# Patient Record
Sex: FEMALE | Race: WHITE | NOT HISPANIC OR LATINO | Employment: FULL TIME | URBAN - METROPOLITAN AREA
[De-identification: names, ages, dates, MRNs, and addresses within clinical notes are randomized per-mention and may not be internally consistent; named-entity substitution may affect disease eponyms.]

---

## 2019-02-25 ENCOUNTER — INITIAL PRENATAL (OUTPATIENT)
Dept: OBGYN CLINIC | Facility: CLINIC | Age: 24
End: 2019-02-25
Payer: COMMERCIAL

## 2019-02-25 VITALS
DIASTOLIC BLOOD PRESSURE: 68 MMHG | HEIGHT: 68 IN | BODY MASS INDEX: 25.76 KG/M2 | SYSTOLIC BLOOD PRESSURE: 120 MMHG | WEIGHT: 170 LBS

## 2019-02-25 DIAGNOSIS — Z36.9 ANTENATAL SCREENING ENCOUNTER: ICD-10-CM

## 2019-02-25 DIAGNOSIS — Z34.01 ENCOUNTER FOR SUPERVISION OF NORMAL FIRST PREGNANCY IN FIRST TRIMESTER: Primary | ICD-10-CM

## 2019-02-25 PROCEDURE — 99204 OFFICE O/P NEW MOD 45 MIN: CPT | Performed by: OBSTETRICS & GYNECOLOGY

## 2019-02-25 NOTE — PROGRESS NOTES
INITIAL PRENATAL NURSE APPT:      Planned pregnancy - not actively attempting - d/c ocps 10/2017 - no birth control since  Pt wears seat belt  Pt has regular dental cleanings - due now  Pt works as  full time, plans to continue  No hx MRSA  Travels plans to [place] 2019 - moved here from [place] 10/2019 with 's job  Reviewed nutrition, SQS testing, CF testing, TDAP @ 28 wks  Initial prenatal labs ordered ([facility])  (+) nausea, no vomiting, (+) breast tenderness  No previous pap smear

## 2019-03-18 ENCOUNTER — ROUTINE PRENATAL (OUTPATIENT)
Dept: OBGYN CLINIC | Facility: CLINIC | Age: 24
End: 2019-03-18
Payer: COMMERCIAL

## 2019-03-18 VITALS — SYSTOLIC BLOOD PRESSURE: 124 MMHG | DIASTOLIC BLOOD PRESSURE: 82 MMHG | WEIGHT: 170.2 LBS | BODY MASS INDEX: 25.88 KG/M2

## 2019-03-18 DIAGNOSIS — Z36.9 ANTENATAL SCREENING ENCOUNTER: ICD-10-CM

## 2019-03-18 DIAGNOSIS — O03.9 MISCARRIAGE: Primary | ICD-10-CM

## 2019-03-18 LAB
ABO GROUP BLD: NORMAL
APPEARANCE UR: ABNORMAL
BACTERIA UR QL AUTO: ABNORMAL /HPF
BASOPHILS # BLD AUTO: 61 CELLS/UL (ref 0–200)
BASOPHILS NFR BLD AUTO: 0.8 %
BILIRUB UR QL STRIP: NEGATIVE
BLD GP AB SCN SERPL QL: NORMAL
COLOR UR: YELLOW
EOSINOPHIL # BLD AUTO: 114 CELLS/UL (ref 15–500)
EOSINOPHIL NFR BLD AUTO: 1.5 %
ERYTHROCYTE [DISTWIDTH] IN BLOOD BY AUTOMATED COUNT: 12.5 % (ref 11–15)
GLUCOSE UR QL STRIP: NEGATIVE
HBV SURFACE AG SERPL QL IA: NORMAL
HCT VFR BLD AUTO: 41 % (ref 35–45)
HGB BLD-MCNC: 13.5 G/DL (ref 11.7–15.5)
HGB UR QL STRIP: NEGATIVE
HIV 1+2 AB+HIV1 P24 AG SERPL QL IA: NORMAL
HYALINE CASTS #/AREA URNS LPF: ABNORMAL /LPF
KETONES UR QL STRIP: NEGATIVE
LEUKOCYTE ESTERASE UR QL STRIP: NEGATIVE
LYMPHOCYTES # BLD AUTO: 2075 CELLS/UL (ref 850–3900)
LYMPHOCYTES NFR BLD AUTO: 27.3 %
MCH RBC QN AUTO: 28.2 PG (ref 27–33)
MCHC RBC AUTO-ENTMCNC: 32.9 G/DL (ref 32–36)
MCV RBC AUTO: 85.8 FL (ref 80–100)
MONOCYTES # BLD AUTO: 524 CELLS/UL (ref 200–950)
MONOCYTES NFR BLD AUTO: 6.9 %
NEUTROPHILS # BLD AUTO: 4826 CELLS/UL (ref 1500–7800)
NEUTROPHILS NFR BLD AUTO: 63.5 %
NITRITE UR QL STRIP: NEGATIVE
PH UR STRIP: 7.5 [PH] (ref 5–8)
PLATELET # BLD AUTO: 281 THOUSAND/UL (ref 140–400)
PMV BLD REES-ECKER: 11.1 FL (ref 7.5–12.5)
PROT UR QL STRIP: NEGATIVE
RBC # BLD AUTO: 4.78 MILLION/UL (ref 3.8–5.1)
RBC #/AREA URNS HPF: ABNORMAL /HPF
RH BLD: NORMAL
RPR SER QL: NORMAL
RUBV IGG SERPL IA-ACNC: 3.31 INDEX
SP GR UR STRIP: 1.02 (ref 1–1.03)
SQUAMOUS #/AREA URNS HPF: ABNORMAL /HPF
WBC # BLD AUTO: 7.6 THOUSAND/UL (ref 3.8–10.8)
WBC #/AREA URNS HPF: ABNORMAL /HPF

## 2019-03-18 PROCEDURE — 99214 OFFICE O/P EST MOD 30 MIN: CPT | Performed by: OBSTETRICS & GYNECOLOGY

## 2019-03-18 NOTE — PATIENT INSTRUCTIONS
Transvaginal and transabdominal ultrasound showed a nonviable fetal pole  No cardiac activity  Will check quantitative beta HCGs S2   Make arrangements for ultrasound  Return my office this week for further evaluation  Patient is Rh positive

## 2019-03-18 NOTE — PROGRESS NOTES
Nonviable fetal pole approximately 6 weeks gestation  Will check quantitative beta HCG, arrangements for official hospital ultrasound, the patient is Rh positive  She return my office in 3 days for discussion of the studies  She will watch out for signs of bleeding or cramping

## 2019-03-19 LAB — B-HCG SERPL-ACNC: ABNORMAL MIU/ML

## 2019-03-20 ENCOUNTER — HOSPITAL ENCOUNTER (OUTPATIENT)
Dept: RADIOLOGY | Age: 24
Discharge: HOME | End: 2019-03-20
Payer: COMMERCIAL

## 2019-03-20 DIAGNOSIS — O03.9 MISCARRIAGE: ICD-10-CM

## 2019-03-20 PROCEDURE — 76815 OB US LIMITED FETUS(S): CPT

## 2019-03-21 ENCOUNTER — TELEPHONE (OUTPATIENT)
Dept: OBGYN CLINIC | Facility: CLINIC | Age: 24
End: 2019-03-21

## 2019-03-21 ENCOUNTER — OFFICE VISIT (OUTPATIENT)
Dept: OBGYN CLINIC | Facility: CLINIC | Age: 24
End: 2019-03-21
Payer: COMMERCIAL

## 2019-03-21 VITALS
WEIGHT: 169.4 LBS | BODY MASS INDEX: 27.23 KG/M2 | SYSTOLIC BLOOD PRESSURE: 110 MMHG | HEIGHT: 66 IN | DIASTOLIC BLOOD PRESSURE: 78 MMHG

## 2019-03-21 DIAGNOSIS — O02.1 MISSED ABORTION: Primary | ICD-10-CM

## 2019-03-21 LAB — B-HCG SERPL-ACNC: ABNORMAL MIU/ML

## 2019-03-21 PROCEDURE — 99214 OFFICE O/P EST MOD 30 MIN: CPT | Performed by: OBSTETRICS & GYNECOLOGY

## 2019-03-21 PROCEDURE — S0191 MISOPROSTOL, ORAL, 200 MCG: HCPCS | Performed by: OBSTETRICS & GYNECOLOGY

## 2019-03-21 NOTE — TELEPHONE ENCOUNTER
Patient noticed she passed one of the cytotec pills  Is unable to replace do to it being found in toilet bowl  Is having increased bleeding and spoke with Dr Galicia this afternoon for follow up  Told her I would run by with Edward Posey  and if needed to do anything different then follow up then discussed would call back

## 2019-03-21 NOTE — PATIENT INSTRUCTIONS
The patient has elected for using Cytotec  Eight hundred micro g replaced inside the vagina  She was counseled watch for increasing cramping and bleeding  To keep herself hydrated  Return my office tomorrow for follow-up

## 2019-03-21 NOTE — PROGRESS NOTES
HPI   this is a 26-year-old white female accompanied by her   Unfortunately had the been diagnosed with a missed   She is now bleeding or cramping of the present time  Ultrasound shows a nonviable fetus approximately 6 weeks size with drinking  We had a discussion of options Cytotec versus D& E  She and her  have now decided to proceed with placement of Cytotec in the vagina for a home remedy  We talked about the possibility of retained tissue we talked about heavy bleeding and cramping  She was offered the possibility of being on the and on scheduled tomorrow for D and E  All questions were answered  We did proceed with placing 800 micro g of Cytotec inside the vagina  She was counseled about increasing cramping  Expect bleeding and heavy  She will be seen in my office tomorrow for follow-up  She is Rh positive

## 2019-03-22 ENCOUNTER — OFFICE VISIT (OUTPATIENT)
Dept: OBGYN CLINIC | Facility: CLINIC | Age: 24
End: 2019-03-22
Payer: COMMERCIAL

## 2019-03-22 VITALS
SYSTOLIC BLOOD PRESSURE: 110 MMHG | BODY MASS INDEX: 27.16 KG/M2 | DIASTOLIC BLOOD PRESSURE: 80 MMHG | HEIGHT: 66 IN | WEIGHT: 169 LBS

## 2019-03-22 DIAGNOSIS — O03.9 MISCARRIAGE: Primary | ICD-10-CM

## 2019-03-22 PROCEDURE — 99213 OFFICE O/P EST LOW 20 MIN: CPT | Performed by: OBSTETRICS & GYNECOLOGY

## 2019-03-22 PROCEDURE — 88305 TISSUE EXAM BY PATHOLOGIST: CPT | Performed by: PATHOLOGY

## 2019-03-22 NOTE — PROGRESS NOTES
HPI   this is a 32-year-old white female accompanied by her   She was seen in this office on the 21st of March with a diagnosis of a missed AB  At that time the patient is Rh positive  She was given the options of surgical D&E verses vaginal Cytotec  She shows vaginal Cytotec  This is work well  Returns today for follow-up  She has some bleeding and cramping  Passes some clots  Transabdominal ultrasound shows the uterus is empty  Pelvic examination shows there is a large gestational sac inside the endocervical canal   This was removed with ring forceps and sent to pathology  Uterus is firm  No active bleeding was noted  Emotions she is doing well  Return my office in 2 weeks for re-evaluation  She was counseled to watch out for fever chills excessive bleeding  She will avoid sexual activity for least 1 week

## 2021-12-06 ENCOUNTER — TELEPHONE (OUTPATIENT)
Dept: PSYCHIATRY | Facility: CLINIC | Age: 26
End: 2021-12-06

## 2022-08-08 LAB
EXTERNAL HEMATOCRIT: 38.2 %
EXTERNAL HEMOGLOBIN: 13.1 G/DL
EXTERNAL HEPATITIS B SURFACE ANTIGEN: NEGATIVE
EXTERNAL PLATELET COUNT: 264 K/ÂΜL
EXTERNAL RUBELLA IGG QUANTITATION: 3.43
HIV1 P24 AG SERPL QL IA: NORMAL

## 2022-08-12 ENCOUNTER — OFFICE VISIT (OUTPATIENT)
Dept: OBGYN CLINIC | Facility: MEDICAL CENTER | Age: 27
End: 2022-08-12
Payer: COMMERCIAL

## 2022-08-12 VITALS
BODY MASS INDEX: 25.78 KG/M2 | DIASTOLIC BLOOD PRESSURE: 72 MMHG | SYSTOLIC BLOOD PRESSURE: 98 MMHG | WEIGHT: 160.4 LBS | HEIGHT: 66 IN

## 2022-08-12 DIAGNOSIS — Z34.91 PRENATAL CARE IN FIRST TRIMESTER: Primary | ICD-10-CM

## 2022-08-12 DIAGNOSIS — N91.1 SECONDARY AMENORRHEA: ICD-10-CM

## 2022-08-12 PROCEDURE — 76817 TRANSVAGINAL US OBSTETRIC: CPT | Performed by: STUDENT IN AN ORGANIZED HEALTH CARE EDUCATION/TRAINING PROGRAM

## 2022-08-12 PROCEDURE — 99213 OFFICE O/P EST LOW 20 MIN: CPT | Performed by: STUDENT IN AN ORGANIZED HEALTH CARE EDUCATION/TRAINING PROGRAM

## 2022-08-12 NOTE — PROGRESS NOTES
PNV 9+2    Patient denies any lof, vb or ctx  Patient urine is neg/neg  Patient has no concerns today

## 2022-08-12 NOTE — PROGRESS NOTES
Assessment/Plan:      27 y o  [id number] at 8 3/7 wga with LYNDA of 3/15/22 by LMP consistent w/ US today  Heart tones WNL  Pt to follow up for OB intake  She is not taking any teratogenic medications  Nausea/vomiting is minimal  Counseled to start prenatal vitamin  Subjective:      Patient ID:      27 y o  [id number] w/ LMP of 6/8 with ega of 9 2/7 wga and LYNDA of 3/15 presents for viability US  She has minimal nausea, no bleeding/cramping  US with C/ Greg Johnsoner 41 7/21 - 6 w 1 d, irregular GS per report      Review of Systems   Constitutional: Negative  Respiratory: Negative  Genitourinary: Negative  Psychiatric/Behavioral: Negative  Objective:      Physical Exam   Constitutional: She appears well-nourished  Cardiovascular: Normal rate  Pulmonary/Chest: Effort normal           Transvaginal US shows a live fetus with a heart beat of 175 bpm  Crown-rump length measures 2 89 cm, consistent with 9 5/7 wga  A yolk sac is visible within the gestational sac  We will keep the patient's due date of 3/15 based on her LMP

## 2022-08-19 ENCOUNTER — INITIAL PRENATAL (OUTPATIENT)
Dept: OBGYN CLINIC | Facility: CLINIC | Age: 27
End: 2022-08-19

## 2022-08-19 VITALS — BODY MASS INDEX: 25.71 KG/M2 | HEIGHT: 66 IN | WEIGHT: 160 LBS

## 2022-08-19 DIAGNOSIS — Z34.81 PRENATAL CARE, SUBSEQUENT PREGNANCY, FIRST TRIMESTER: ICD-10-CM

## 2022-08-19 PROCEDURE — OBC: Performed by: STUDENT IN AN ORGANIZED HEALTH CARE EDUCATION/TRAINING PROGRAM

## 2022-08-19 NOTE — PROGRESS NOTES
OB INTAKE INTERVIEW  Patient is 27 y o y o  who presents for OB intake at 10wks  She is accompanied by: herself  The father of her baby Renard Trevino) is involved in the pregnancy and is 32years old    Last Menstrual Period: 2022  Ultrasound: Measured 9 weeks 5 days on 2022 by CS  Estimated Date of Delivery: 3/15/2023 confirmed by 9 week US    Signs/Symptoms of Pregnancy  Current pregnancy symptoms: nausea  Constipation no  Headaches YES- if hasnt eaten in awhile   Cramping/spotting no  PICA cravings no    Diabetes-  Body mass index is 25 82 kg/m²  If patient has 1 or more, please order early 1 hour GTT  History of GDM no  BMI >35 no  History of PCOS or current metformin use no  History of LGA/macrosomic infant (4000g/9lbs) YES    If patient has 2 or more, please order early 1 hour GTT  BMI>30 no  AMA no  First degree relative with type 2 diabetes no  History of chronic HTN, hyperlipidemia, elevated A1C no  High risk race (, , ,  or ) no    Hypertension- if you answer yes, please order preeclampsia labs (cbc, comprehensive metabolic panel, urine protein creatinine ratio, uric acid)  History of of chronic HTN no  History of gestational HTN no  History of preeclampsia, eclampsia, or HELLP syndrome no  History of diabetes no  History of lupus, autoimmune disease, kidney disease no    Thyroid- if yes order TSH with reflex T4  History of thyroid disease no    Bleeding Disorder or Hx of DVT-patient or first degree relative with history of  Order the following if not done previously     (Factor V, antithrombin III, prothrombin gene mutation, protein C and S Ag, lupus anticoagulant, anticardiolipin, beta-2 glycoprotein)   no    OB/GYN-  History of abnormal pap smear no  History of HPV no  History of Herpes/HSV no  History of other STI (gonorrhea, chlamydia, trich) no  History of prior  YESx1  History of prior  no  History of  delivery prior to 36 weeks 6 days no  History of blood transfusion no  Ok for blood transfusion YES    Substance screening- if yes outside of tobacco for her or anyone in her home-order urine drug screen  History of tobacco use no  Currently using tobacco no  Currently using alcohol no  Presently using drugs no  Past drug use  no  IV drug use-If yes add Hep C antibody to labs no  Partner drug use no  Parent/Family drug use no    MRSA Screening-   Does the pt have a hx of MRSA? no  If yes- please follow MRSA protocol and obtain a nasal swab for MRSA culture    Immunizations:  Influenza vaccine given this season YES  Discussed Tdap vaccine YES  Discussed COVID Vaccine YES + booster    Genetic/MFM-  Do you or your partner have a history of any of the following in yourselves or first degree relatives? Cystic fibrosis no  Spinal muscular atrophy no  Hemoglobinopathy/Sickle Cell/Thalassemia no  Fragile X Intellectual Disability no    If yes, discuss carrier screening and recommend consultation with [facility]/genetic counseling  If no, discuss option for carrier screening and/or genetic testing with Nuchal Ultrasound  Patient interested YES  Appointment at [facility] made YES, NT is 9/13    Interview education  [facility] Pregnancy Essentials Book reviewed and discussed YES    Nurse/Family Partnership- patient may qualify NO; referral placed NO    Prenatal lab work scripts YES  Extra labs ordered:  RH- all other labs done on 8/8 from [facility]    The patient has a history now or in prior pregnancy notable for:  LGA      Details that I feel the provider should be aware of: Kp Menjivar and Elizabeth Lundberg are expecting their second baby  She delivered previously with [facility]- she did her PN labs with them and early US also  She also have a LGA baby, which she said runs in the family- her and sisters were mostly >9lbs  She said she was never dx with GDM  Explained I will add 1hr and SL RH to have on file  She was fine with doing the early screening   She is doing pretty well  Some nausea and HA is does for too long without eating  PN1 visit scheduled  The patient was oriented to our practice, reviewed delivering physicians and [facility] for Delivery  All questions were answered      Interviewed by: Alissa Lan MA

## 2022-08-19 NOTE — PATIENT INSTRUCTIONS
Congratulations!! Please review our Pregnancy Essential Guide and [employer] L[employer] Virtual tour from our [employer]  [facility] P[facility] Ess[facility] Guide  [facility] ([address] [facility])     [address] [facility] ([employer]  [facility])

## 2022-08-29 ENCOUNTER — INITIAL PRENATAL (OUTPATIENT)
Dept: OBGYN CLINIC | Facility: CLINIC | Age: 27
End: 2022-08-29

## 2022-08-29 VITALS — BODY MASS INDEX: 26.31 KG/M2 | SYSTOLIC BLOOD PRESSURE: 120 MMHG | WEIGHT: 163 LBS | DIASTOLIC BLOOD PRESSURE: 64 MMHG

## 2022-08-29 DIAGNOSIS — Z34.80 SUPERVISION OF OTHER NORMAL PREGNANCY, ANTEPARTUM: ICD-10-CM

## 2022-08-29 DIAGNOSIS — O26.20 HISTORY OF RECURRENT ABORTION, ANTEPARTUM: ICD-10-CM

## 2022-08-29 DIAGNOSIS — Z78.9 NOT IMMUNE TO HEPATITIS B VIRUS: ICD-10-CM

## 2022-08-29 DIAGNOSIS — Z11.3 SCREEN FOR STD (SEXUALLY TRANSMITTED DISEASE): ICD-10-CM

## 2022-08-29 DIAGNOSIS — Z34.81 PRENATAL CARE, SUBSEQUENT PREGNANCY, FIRST TRIMESTER: Primary | ICD-10-CM

## 2022-08-29 DIAGNOSIS — Z82.79 FAMILY HISTORY OF AUTOSOMAL TRANSLOCATION: ICD-10-CM

## 2022-08-29 PROCEDURE — 87491 CHLMYD TRACH DNA AMP PROBE: CPT | Performed by: CLINICAL NURSE SPECIALIST

## 2022-08-29 PROCEDURE — PNV: Performed by: CLINICAL NURSE SPECIALIST

## 2022-08-29 PROCEDURE — 87591 N.GONORRHOEAE DNA AMP PROB: CPT | Performed by: CLINICAL NURSE SPECIALIST

## 2022-08-29 RX ORDER — CELECOXIB 200 MG/1
CAPSULE ORAL
COMMUNITY
Start: 2022-06-28 | End: 2022-08-29

## 2022-08-29 RX ORDER — ASPIRIN 81 MG/1
81 TABLET, COATED ORAL 2 TIMES DAILY
COMMUNITY
Start: 2022-06-28 | End: 2022-08-29

## 2022-08-29 RX ORDER — OXYCODONE HYDROCHLORIDE AND ACETAMINOPHEN 5; 325 MG/1; MG/1
TABLET ORAL
COMMUNITY
Start: 2022-06-28 | End: 2022-08-29

## 2022-08-29 NOTE — PROGRESS NOTES
Prenatal Visit  Subjective: [employer] is a 24 y o  female  She is a [id number] here for initial PN visit/New OB exam    She is reporting the following pregnancy related complaints:  Occasional nausea when stomach empty  Denies vaginal bleeding  Denies cramping  LMP 22 with [facility] 3/15/23  US done 22- IUP c/w GA by LMP  No change in [facility]    Past history review including family genetic history reveal the following risk factors:  1  Prenatal care, subsequent pregnancy, first trimester    2  Screen for STD (sexually transmitted disease)    3  Supervision of other normal pregnancy, antepartum    4  History of recurrent , antepartum    5  Family history of autosomal translocation    6  Not immune to hepatitis B virus         Objective:  Patient's last menstrual period was 2022 (exact date)  /64   Wt 73 9 kg (163 lb)   LMP 2022 (Exact Date)   BMI 26 31 kg/m²   Pregravid Weight/BMI: 72 1 kg (159 lb) (BMI 25 68)      OBGyn Exam- see prenatal physical form    Assessment & Plan:      1  Prenatal care, subsequent pregnancy, first trimester  -     POCT urine dip  -     Chlamydia/GC amplified DNA by PCR    2  Screen for STD (sexually transmitted disease)  -     Chlamydia/GC amplified DNA by PCR    3  Supervision of other normal pregnancy, antepartum  Assessment & Plan:  She is a [id number] at 11w5d  Viability previously established  + FHR today by [place]  New OB Exam completed  Pap is not indicated and gonorrhea/chlamydia cultures collected  See other A&P for risk factors/identified    I reviewed the expected course of the pregnancy with visits, labs and additional testing  The patient has obtained her prenatal labs and they were reviewed at this visit  Genetic screening/testing options again reviewed and she declines genetic screening    I have reviewed the maternal as well as infant benefits of breastfeeding with the patient  The patient currently plans to breastfeed     I have reviewed proper nutrition in pregnancy as well as exercise and safe medications that can be used for various common symptoms in pregnancy  I reviewed the reasons to call in the first trimester - namely vaginal bleeding, severe nausea and vomiting, severe pelvic pain, fevers or pain/burning with urination  She was already previously referred to [facility] for [facility] US and will scheduled for level 2 G&A for 20-21 wks after that appt  I recommended that the patient receive the covid vaccine if not already done and to receive the flu vaccine during flu season  All questions were answered to her satisfaction  4  History of recurrent , antepartum  Assessment & Plan:  2 prior 1st trim sab- genetics consult ordered    Orders:  -     Ambulatory Referral to Maternal Fetal Medicine; Future; Expected date: 2022    5  Family history of autosomal translocation  Assessment & Plan:  Paternal cousin with balanced translocation  Genetic consult ordered    Orders:  -     Ambulatory Referral to Maternal Fetal Medicine; Future; Expected date: 2022    6   Not immune to hepatitis B virus  Assessment & Plan:  Pt counseled on non-immune status  Can be obtained through PCP          MEL Amato  2022

## 2022-08-29 NOTE — PROGRESS NOTES
Initial OB- previous pregnancy at [facility]  12w1d  Blue folder given  Pap- 3/18/21-negative  GC done today  AB positive  No cramping or bleeding  Round ligament discomfort

## 2022-08-29 NOTE — ASSESSMENT & PLAN NOTE
She is a [id number] at [facility] previously established  + FHR today by [employer]  New OB Exam completed  Pap is not indicated and gonorrhea/chlamydia cultures collected  See other A&P for risk factors/identified    I reviewed the expected course of the pregnancy with visits, labs and additional testing  The patient has obtained her prenatal labs and they were reviewed at this visit  Genetic screening/testing options again reviewed and she declines genetic screening    I have reviewed the maternal as well as infant benefits of breastfeeding with the patient  The patient currently plans to breastfeed  I have reviewed proper nutrition in pregnancy as well as exercise and safe medications that can be used for various common symptoms in pregnancy  I reviewed the reasons to call in the first trimester - namely vaginal bleeding, severe nausea and vomiting, severe pelvic pain, fevers or pain/burning with urination  She was already previously referred to [facility] for NT US and will scheduled for level 2 G&A for 20-21 wks after that appt  I recommended that the patient receive the covid vaccine if not already done and to receive the flu vaccine during flu season  All questions were answered to her satisfaction

## 2022-08-30 ENCOUNTER — APPOINTMENT (OUTPATIENT)
Dept: LAB | Facility: CLINIC | Age: 27
End: 2022-08-30
Payer: COMMERCIAL

## 2022-08-30 DIAGNOSIS — Z34.81 PRENATAL CARE, SUBSEQUENT PREGNANCY, FIRST TRIMESTER: ICD-10-CM

## 2022-08-30 LAB
ABO GROUP BLD: NORMAL
BLD GP AB SCN SERPL QL: NEGATIVE
C TRACH DNA SPEC QL NAA+PROBE: NEGATIVE
GLUCOSE 1H P 50 G GLC PO SERPL-MCNC: 84 MG/DL (ref 40–134)
N GONORRHOEA DNA SPEC QL NAA+PROBE: NEGATIVE
RH BLD: POSITIVE

## 2022-08-30 PROCEDURE — 86850 RBC ANTIBODY SCREEN: CPT

## 2022-08-30 PROCEDURE — 82950 GLUCOSE TEST: CPT

## 2022-08-30 PROCEDURE — 86900 BLOOD TYPING SEROLOGIC ABO: CPT

## 2022-08-30 PROCEDURE — 86901 BLOOD TYPING SEROLOGIC RH(D): CPT

## 2022-08-30 PROCEDURE — 36415 COLL VENOUS BLD VENIPUNCTURE: CPT

## 2022-09-13 ENCOUNTER — ROUTINE PRENATAL (OUTPATIENT)
Dept: PERINATAL CARE | Facility: OTHER | Age: 27
End: 2022-09-13
Payer: COMMERCIAL

## 2022-09-13 VITALS
SYSTOLIC BLOOD PRESSURE: 124 MMHG | DIASTOLIC BLOOD PRESSURE: 76 MMHG | WEIGHT: 165.34 LBS | BODY MASS INDEX: 26.57 KG/M2 | HEIGHT: 66 IN | HEART RATE: 81 BPM

## 2022-09-13 DIAGNOSIS — O09.299 HISTORY OF MACROSOMIA IN INFANT IN PRIOR PREGNANCY, CURRENTLY PREGNANT: ICD-10-CM

## 2022-09-13 DIAGNOSIS — Z3A.13 13 WEEKS GESTATION OF PREGNANCY: ICD-10-CM

## 2022-09-13 DIAGNOSIS — Z36.82 ENCOUNTER FOR ANTENATAL SCREENING FOR NUCHAL TRANSLUCENCY: Primary | ICD-10-CM

## 2022-09-13 PROCEDURE — 76813 OB US NUCHAL MEAS 1 GEST: CPT | Performed by: OBSTETRICS & GYNECOLOGY

## 2022-09-13 PROCEDURE — 99241 PR OFFICE CONSULTATION NEW/ESTAB PATIENT 15 MIN: CPT | Performed by: OBSTETRICS & GYNECOLOGY

## 2022-09-13 NOTE — PATIENT INSTRUCTIONS
Thank you for choosing us for your  care today  If you have any questions about your ultrasound or care, please do not hesitate to contact us or your primary obstetrician  Some general instructions for your pregnancy are:    Protect against coronavirus: get vaccinated - pregnant women are increased risk of severe COVID  Notify your primary care doctor if you have any symptoms  Exercise: Aim for 22 minutes per day (150 minutes per week) of regular exercise  Walking is great! Nutrition: aim for calcium-rich and iron-rich foods as well as healthy sources of protein  Learn about Preeclampsia: preeclampsia is a common, serious high blood pressure complication in pregnancy  A blood pressure of 103JYIS (systolic or top number) or 68EEIT (diastolic or bottom number) is not normal and needs evaluation by your doctor  Aspirin is sometimes prescribed in early pregnancy to prevent preeclampsia in women with risk factors - ask your obstetrician if you should be on this medication  If you smoke, try to reduce how many cigarettes you smoke or try to quit completely  Do not vape  Other warning signs to watch out for in pregnancy or postpartum: chest pain, obstructed breathing or shortness of breath, seizures, thoughts of hurting yourself or your baby, bleeding, a painful or swollen leg, fever, or headache (see [employer] POST-BIRTH Warning Signs campaign)  If these happen call 911  Itching is also not normal in pregnancy and if you experience this, especially over your hands and feet, potentially worse at night, notify your doctors

## 2022-09-13 NOTE — LETTER
2022    Kimberly Javier,  [facility] [address] [address]    Patient: Rafi Garcia   YOB: 1995   Date of Visit: 2022   Gestational age 14w11d   Amber Hannah of this communication: Routine       Dear Simin Guillen,    This patient was seen recently in our  office  The content of my evaluation today is in the ultrasound report under "OB Procedures" tab  Please don't hesitate to contact our office with any concerns or questions       Sincerely,      Jesse Lopez MD  Attending Physician, [place]

## 2022-09-13 NOTE — PROGRESS NOTES
Patient chose to have [facility] Non-invasive Prenatal Screen  Patient given brochure and is aware [facility] will contact patients insurance and coordinate coverage  Patient made aware she will need to respond to text message or e-mail from [employer] within 2 business days or testing will be run through insurance  Patient informed text message will come from area code  "415"  Provided [employer] # [phone] and web site : [email or web address]     2 vials of blood drawn from left arm, by Marcel Vazquez MA patient tolerated blood draw without difficulty  Specimens labeled with patient identifiers (name, date of birth, specimen collection date), order and specimen was verified with patient, packed and sent via [employer]  Copy of lab order scanned to Epic media  Maternal Fetal Medicine will have results in approximately 7-10 business days and will call patient or notify via [address]  Patient aware viewing lab result online will reveal fetal sex If ordered  Patient verbalized understanding of all instructions and no questions at this time

## 2022-09-13 NOTE — PROGRESS NOTES
[address]: Ms Jey Cotton was seen today for nuchal translucency ultrasound  See ultrasound report under "OB Procedures" tab  Review of Systems   Constitutional: Negative for chills, fever and unexpected weight change  HENT: Negative for congestion, dental problem, facial swelling and sore throat  Eyes: Negative for visual disturbance  Respiratory: Negative for cough and shortness of breath  Cardiovascular: Negative for chest pain and palpitations  Gastrointestinal: Negative for diarrhea and vomiting  Endocrine: Negative for polydipsia  Genitourinary: Negative for dysuria and vaginal bleeding  Musculoskeletal: Negative for back pain and joint swelling  Skin: Negative for rash and wound  Allergic/Immunologic: Negative for immunocompromised state  Neurological: Negative for seizures and headaches  Hematological: Does not bruise/bleed easily  Psychiatric/Behavioral: Negative for hallucinations and suicidal ideas  Physical Exam  Constitutional:       General: She is not in acute distress  Appearance: Normal appearance  She is not ill-appearing, toxic-appearing or diaphoretic  HENT:      Head: Normocephalic and atraumatic  Nose: No congestion or rhinorrhea  Eyes:      General: No scleral icterus  Right eye: No discharge  Left eye: No discharge  Extraocular Movements: Extraocular movements intact  Conjunctiva/sclera: Conjunctivae normal    Pulmonary:      Effort: Pulmonary effort is normal  No respiratory distress  Musculoskeletal:      Cervical back: Normal range of motion  Skin:     Coloration: Skin is not jaundiced or pale  Findings: No erythema, lesion or rash  Neurological:      General: No focal deficit present  Mental Status: She is alert and oriented to person, place, and time     Psychiatric:         Mood and Affect: Mood normal          Behavior: Behavior normal          Please don't hesitate to contact our office with any concerns or questions    Drake Bose MD

## 2022-09-19 ENCOUNTER — TELEMEDICINE (OUTPATIENT)
Dept: PERINATAL CARE | Facility: CLINIC | Age: 27
End: 2022-09-19

## 2022-09-19 DIAGNOSIS — Z84.89 FAMILY HISTORY OF GENETIC DISORDER: Primary | ICD-10-CM

## 2022-09-19 DIAGNOSIS — O35.2XX0 HEREDITARY DISEASE IN FAMILY POSSIBLY AFFECTING FETUS, AFFECTING MANAGEMENT OF MOTHER IN PREGNANCY, SINGLE OR UNSPECIFIED FETUS: ICD-10-CM

## 2022-09-19 DIAGNOSIS — Z31.5 ENCOUNTER FOR PROCREATIVE GENETIC COUNSELING: ICD-10-CM

## 2022-09-19 PROCEDURE — NC001 PR NO CHARGE: Performed by: GENETIC COUNSELOR, MS

## 2022-09-26 NOTE — PROGRESS NOTES
Genetic Counseling   High-Risk Gestation Note    Appointment Date:  2022  Referred By: Cameron Lopez  YOB: 1995  Partner:  James Milton  Indication for Visit:  personal and/or family history of chromosomal abnormality:  family history of balanced translocation  Pregnancy History:   Estimated Date of Delivery: 3/15/23  Estimated Gestational Age: 12w7d      Virtual Regular Visit    Verification of patient location:    Patient is located in the following state in which I hold an active license [place]      Assessment/Plan:    Problem List Items Addressed This Visit    None     Visit Diagnoses     Family history of genetic disorder    -  Primary    Hereditary disease in family possibly affecting fetus, affecting management of mother in pregnancy, single or unspecified fetus        Encounter for procreative genetic counseling                   Reason for visit is   Chief Complaint   Patient presents with    Virtual Regular Visit        Encounter provider Natalie Mc    Provider located at [address] [place] [address] [id number] [phone]      Recent Visits  No visits were found meeting these conditions  Showing recent visits within past 7 days and meeting all other requirements  Future Appointments  No visits were found meeting these conditions  Showing future appointments within next 150 days and meeting all other requirements       The patient was identified by name and date of birth  Marik Somers was informed that this is a telemedicine visit and that the visit is being conducted through [address] and patient was informed this is a secure, HIPAA-complaint platform  She agrees to proceed     My office door was closed  No one else was in the room  She acknowledged consent and understanding of privacy and security of the video platform   The patient has agreed to participate and understands they can discontinue the visit at any time  Iftikhar Ibarra is a 24 y o  female who presented for genetic counseling to discuss a family history of a balanced translocation  She had previously met with a genetic counselor at [facility] on 7/23/20 to discuss this history  Please see that note in [address] [place] [address] Loop for further details  Tiffany's paternal cousin was found to have a balanced translocation after experiencing seven first trimester pregnancy loss  The patient did not have information on the specific chromosomes involved in the translocation  Of note, Tiffany's paternal grandmother also had about 10 pregnancy losses, though the causes are not known  We discussed that carriers of reciprocal balanced translocations are typically unaffected individuals because they have all of their genetic information present, just in a different arrangement than what is typically seen  If this translocation is passed on there is a possibility for a pregnancy to inherit the balanced translocation and therefore be predicted to be unaffected  There is also the possibility for a pregnancy to inherit an unbalanced arrangement and be at risk for pregnancy loss, congenital anomalies and/or intellectual disability/developmental delays  Lastly, there is the possibility for a pregnancy to not inherit the translocation and not be affected  The risk for any unbalanced translocation in a pregnancy given a balanced chromosome translocation carrier ranges from 1-30%, with approximately 5-10% as the most common published risk  We discussed the option of a peripheral chromosome analysis for Hafsa Carias which she elected to pursue pending insurance approval   She will be notified for her blood draw once the prior authorization has been obtained  Histories for the patient and her partner's family were taken during our session  Hafsa Carias reports a paternal cousin once removed with Neurofibromatosis (NF)    She is not aware of the specific type but states that there are "vision issues"  There are no other paternal family members with any symptoms or features of NF thus her relative may have a de dianne mutation  We discussed that risk to the pregnancy would be that of the general population risk for NF  Further review of family history for the patient and her partner was noncontributory  The family history was not significant for other genetic diseases or disorders, intellectual disability, birth defects, fetal loss, or consanguinity  Patient reports being of /[place]al[place] descent and that her  is of [place]/ descent  She denies either of them having known Ashkenazi Sabianism ancestry  The benefits and limitations of Cystic fibrosis (CF), Spinal muscular atrophy (SMA), hemoglobinopathy, and expanded carrier screening was discussed  The patient was unsure of pursuing carrier screening at this time and elected to further consider the options  Should she decide to have any of the blood work performed she will contact our office or her OB office  The patient had NIPT bloodwork drawn on 9/13/22 and the results were pending at the time of our counseling session  We reviewed MSAFP screening at 15-20 weeks gestation which she elected to pursue  We reviewed that level II anatomy ultrasound is typically performed at approximately 20 weeks gestation  Level II ultrasound evaluation is between 60-80% accurate in detecting major physical birth defects and variations in fetal development that may be associated with chromosome/genetic abnormalities  Level II ultrasound evaluation is not able to detect all birth defects or health problems  She had her ultrasound scheduled for 10/25/22      Lastly, we discussed the fact that everyone in the general population regardless of age, family history, or medical background has approximately a 3-5% risk of having a child with some type of congenital anomaly, genetic disease or intellectual disability  Currently there are no tests available to rule out all birth defects or health problems  Casey Montero was provided with our contact information  I encouraged her to call with any questions or concerns  Plan/Tests Ordered:  1) Patient elected a peripheral chromosome analysis pending insurance approval   2) MSAFP screening at 15-20 weeks gestation - to be ordered by patient OB office  3) Level II anatomy ultrasound at approximately 20 weeks gestation  HPI     History reviewed  No pertinent past medical history  Past Surgical History:   Procedure Laterality Date    HAND SURGERY      age 17 - trauma    TONSILLECTOMY AND ADENOIDECTOMY      WISDOM TOOTH EXTRACTION         Current Outpatient Medications   Medication Sig Dispense Refill    Prenatal Multivit-Min-Fe-FA (PRENATAL VITAMINS PO) Take 1 tablet by mouth daily       No current facility-administered medications for this visit  No Known Allergies    Review of Systems    Video Exam    There were no vitals filed for this visit      Physical Exam     I spent 45 minutes directly with the patient during this visit

## 2022-09-28 ENCOUNTER — ROUTINE PRENATAL (OUTPATIENT)
Dept: OBGYN CLINIC | Facility: CLINIC | Age: 27
End: 2022-09-28

## 2022-09-28 VITALS — BODY MASS INDEX: 27.7 KG/M2 | DIASTOLIC BLOOD PRESSURE: 64 MMHG | WEIGHT: 171.6 LBS | SYSTOLIC BLOOD PRESSURE: 116 MMHG

## 2022-09-28 DIAGNOSIS — Z3A.16 16 WEEKS GESTATION OF PREGNANCY: ICD-10-CM

## 2022-09-28 DIAGNOSIS — O26.899 PELVIC PRESSURE IN PREGNANCY: ICD-10-CM

## 2022-09-28 DIAGNOSIS — Z36.9 UNSPECIFIED ANTENATAL SCREENING: ICD-10-CM

## 2022-09-28 DIAGNOSIS — Z33.1 PREGNANT STATE, INCIDENTAL: Primary | ICD-10-CM

## 2022-09-28 DIAGNOSIS — R10.2 PELVIC PRESSURE IN PREGNANCY: ICD-10-CM

## 2022-09-28 DIAGNOSIS — Z34.81 PRENATAL CARE, SUBSEQUENT PREGNANCY, FIRST TRIMESTER: ICD-10-CM

## 2022-09-28 LAB
SL AMB  POCT GLUCOSE, UA: NORMAL
SL AMB POCT URINE PROTEIN: NORMAL

## 2022-09-28 PROCEDURE — PNV: Performed by: OBSTETRICS & GYNECOLOGY

## 2022-09-28 NOTE — ASSESSMENT & PLAN NOTE
PN panel complete  NIPT low risk - did not find out gender, plans to find out at level II US  MSAFP ordered today  No bleeding/LOF/cramping  Level II US scheduled

## 2022-09-28 NOTE — PROGRESS NOTES
Feels well, c/o pelvic pressure   Denies any bleeding or cramping   AFP given today  Level II scheduled 10/25/2022

## 2022-09-28 NOTE — PROGRESS NOTES
Problem List Items Addressed This Visit        Other    Pelvic pressure in pregnancy     Had significant issues with this in first pregnancy  Discussed support belt as well as pelvic floor PT - she is very interested in PT  Referral given  Relevant Orders    Ambulatory Referral to Physical Therapy    16 weeks gestation of pregnancy     PN panel complete  NIPT low risk - did not find out gender, plans to find out at level II US  MSAFP ordered today  No bleeding/LOF/cramping  Level II US scheduled              Other Visit Diagnoses     Pregnant state, incidental    -  Primary    Relevant Orders    Alpha fetoprotein, maternal    Prenatal care, subsequent pregnancy, first trimester        Relevant Orders    POCT urine dip (Completed)    Ambulatory Referral to Physical Therapy    Unspecified  screening        Relevant Orders    Alpha fetoprotein, maternal

## 2022-09-28 NOTE — ASSESSMENT & PLAN NOTE
Had significant issues with this in first pregnancy  Discussed support belt as well as pelvic floor PT - she is very interested in PT  Referral given

## 2022-09-29 ENCOUNTER — DOCUMENTATION (OUTPATIENT)
Dept: PERINATAL CARE | Facility: CLINIC | Age: 27
End: 2022-09-29

## 2022-09-29 NOTE — PROGRESS NOTES
Call insurance to request auth for [phone] and [phone] per the automated system auth is not require, Confirmation # [id number]

## 2022-10-09 NOTE — PROGRESS NOTES
PT Evaluation     Today's date: 10/10/2022  Patient name: Wily Ordonez  : 1995  MRN: [id number]  Referring provider: Barbee Frankel, MD  Dx:   Encounter Diagnosis     ICD-10-CM    1  Abdominal weakness  R19 8    2  Prenatal care, subsequent pregnancy, first trimester  Z34 81 Ambulatory Referral to Physical Therapy   3  Pelvic pressure in pregnancy  O26 899 Ambulatory Referral to Physical Therapy    R10 2        Start Time: 1682  Stop Time: 1600  Total time in clinic (min): 50 minutes    Assessment  Assessment details: [employer] is a 24y o  year old female with complaints of pelvic girdle pain and pelvic pressure during pregnancy  The following impairments were found on evaluation: poor bladder habits, core weakness, poor pressure management  These impairments contribute to the following functional limitations: decreased tolerance to activity and functional mobility; and the following disability: decreased quality of life  Focus of POC is on core and pelvic girdle strengthening to maintain function t/o pregnancy  [employer]  is a good candidate for therapy and would benefit from skilled physical therapy to address the above impairments in order to allow the patient to achieve below goals and return to her prior level of function  Patient education provided on PFM anatomy and function, abdominal brace  and urge deferral strategy  Patient educated on diagnosis, plan of care and prognosis  Isabel Au are in agreement with recommended plan of care, goals for therapy and demonstrates motivation for active participation in proposed plan of care      Thank you Dr Fior Murphy for this referral!    Impairments: abnormal coordination, abnormal muscle tone, abnormal or restricted ROM, activity intolerance, impaired balance, impaired physical strength, lacks appropriate home exercise program, pain with function, poor posture  and poor body mechanics  Barriers to therapy: None   Understanding of Dx/Px/POC: good Prognosis: good    Goals  STG to be completed in 8 weeks from 10/10/2022:  1  Laurie Lewis will be independent with initial home exercise program    2  Laurie Lewis will demonstrate ability to contract, relax and actively lengthen PFM  3  Laurie Lewis will be independent with abdominal brace with position changes  4  Laurie Lewis will report pain no greater than 5/10 with all functional activity to allow Niki to return to maintain level of function  5  Laurie Lewis will be independent with urge deferral strategies  LTG to be completed in 12 weeks from 10/10/2022 or upon discharge from PFPT:  1  Laurie Lewis will be independent with advanced home exercise program for self-management of symptoms  2  Laurie Lewis will demonstrate ability to appropriately activate deep core muscles with functional mobility tasks  3  Laurie Lewis will be able to wear no pad or liner to decrease risk of urinary tract infection  4  Laurie Lewis will report pain no greater than 3/10 with all functional activity to allow Niki to return to maintain level of function  3  Laurie Lewis will report being able to participate in vaginal intercourse with no pain to allow patient improved quality of life  5  Laurie Lewis will be able to participate in pain-free vaginal examination to allow for health maintenance and monitoring  10  Laurie Lewis will participate in early labor positioning education          Plan  Patient would benefit from: skilled physical therapy  Planned modality interventions: biofeedback, cryotherapy, thermotherapy: hydrocollator packs and ultrasound  Planned therapy interventions: abdominal trunk stabilization, activity modification, ADL retraining, balance, balance/weight bearing training, behavior modification, body mechanics training, breathing training, coordination, fine motor coordination training, flexibility, functional ROM exercises, gait training, graded activity, graded exercise, graded motor, home exercise program, joint mobilization, manual therapy, massage, motor coordination training, neuromuscular re-education, patient education, postural training, strengthening, stretching, therapeutic activities and therapeutic exercise  Frequency: 1x week  Duration in weeks: 14  Plan of Care beginning date: 10/10/2022  Plan of Care expiration date: 2023  Treatment plan discussed with: patient, PTA and referring physician        PT Pelvic Floor Subjective:   History of Present Illness: Nadege Neri is a 24 y o  female who prefers she/her pronouns  They present to pelvic floor physical therapy with the primary complaint of pelvic girdle pain and pelvic pressure during pregnancy  They are referred to OPPT by Dr Dunia Peña Pu reports she is feeling good so far  During first pregnancy had a lot of hip and pelvic pain starting in second trimester that progressively got worse  Had pubic symphysis pain with first   Her mom has prolapse and is concerned about this for herself  Right now stating to feel a little pressure and would like to get started to prevent the discomfort she had last time        Due Date: March 15 2023    Social Support:     Lives in:  Multiple-level home (no issues on the stairs)    Lives with:  Spouse and young children    Relationship status: /committed    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 )    Life stress severity: mild  Diet and Exercise:    Diet:balanced nutrition    Exercise type: no activity and walking    Likes to go walking and be outside     Not exercising due to: lack of time  Co-morbidities:    Patient Active Problem List:     Not immune to hepatitis B virus     History of recurrent , antepartum     History of macrosomia in infant in prior pregnancy, currently pregnant     Pelvic pressure in pregnancy     16 weeks gestation of pregnancy  OB/ gyn History    Gestational History:     Prior Pregnancy: Yes      Number of prior pregnancies: 4    Number of term pregnancies: 1 (2 miscarriages )    Delivery Type: vaginal delivery      Number of vaginal deliveries: 1    Delivery Complications:  Did have tearing - anterior to clitoris   Bladder Function:     Voiding Difficulties positive for: hesitancy (has to intentionally relax ), straining and incomplete emptying      Voiding Difficulties negative for: urgency and frequent urination      Voiding Difficulties comments:     Voiding frequency: every 1-2 hours    Urinary leakage: urine leakage    Urinary leakage aggravated by: coughing, sneezing and hearing running water    Urinary leakage not aggravated by: bending, standing up, walking to the bathroom, key-in-the-door syndrome, seeing a toilet and post-void dribble    Nocturia (episodes per night): 0    Painful urination: No      Intake (ounces):      Intake (ounces) comment: Water 64oz goal   Soda rarely   Incontinence Management:     Pads/Diaper Use:  None  Bowel Function:     Voiding DIfficulties: unfinished feeling after defecating      Bowel frequency: daily    [place] Stool Scale: type 4 and type 5    Stool softener use: no stool softeners    Uses "squatty potty": Squatty Potty  Sexual Function:     Sexually Active:  Sexually active    Pain during intercourse: Yes (lower adominal pain position dependent )      Lubrication Use: Yes    pain causes abstinence    Patient wishes to return to having intercourse: currently unable to have intercourse but wants to  Pain:     Current pain ratin    At best pain ratin    At worst pain ratin    Location:  Lower abdomen/pelvic pressure     Quality:  Pressure    Aggravating factors:  [place] and walking  Treatments:     None    Patient Goals:     Patient goals for therapy:  Decreased pain, fully empty bladder or bowels, improved bladder or bowel function, improved comfort, improved pain management and improved quality of life    Other patient goals:  "to have a less painful pregnancy and to be able to feel like I can walk about and do things freely"       Objective  Forward head, rounded shoulders, sacral sitting        Precautions: standard        10/10/2022          Patient Ed           PFM anatomy and function KH          Pressure system  [address]          Abdominal brace  [facility]          Positioning in bed Discussed           "knack"           Urge deferral (in shower) [address]           Bladder habits and health  [address]                                           Neuro Re-Ed           STS with core activation  [facility] x10                                                                            Ther Ex           Warm-up            pball mobility            LAQ/Marches on ball            Quadruped alt arms            Cat cow            Heel sit to tall kneel                                  Ther Activity                                 Manual           PFM exam                                                        Modalities

## 2022-10-10 ENCOUNTER — EVALUATION (OUTPATIENT)
Dept: PHYSICAL THERAPY | Facility: REHABILITATION | Age: 27
End: 2022-10-10
Payer: COMMERCIAL

## 2022-10-10 DIAGNOSIS — R19.8 ABDOMINAL WEAKNESS: Primary | ICD-10-CM

## 2022-10-10 DIAGNOSIS — Z34.81 PRENATAL CARE, SUBSEQUENT PREGNANCY, FIRST TRIMESTER: ICD-10-CM

## 2022-10-10 DIAGNOSIS — O26.899 PELVIC PRESSURE IN PREGNANCY: ICD-10-CM

## 2022-10-10 DIAGNOSIS — R10.2 PELVIC PRESSURE IN PREGNANCY: ICD-10-CM

## 2022-10-10 PROCEDURE — 97161 PT EVAL LOW COMPLEX 20 MIN: CPT

## 2022-10-10 PROCEDURE — 97530 THERAPEUTIC ACTIVITIES: CPT

## 2022-10-13 ENCOUNTER — TELEPHONE (OUTPATIENT)
Dept: PERINATAL CARE | Facility: CLINIC | Age: 27
End: 2022-10-13

## 2022-10-13 DIAGNOSIS — Z82.79 FAM HX-CONGENITAL ANOMALIES: ICD-10-CM

## 2022-10-13 DIAGNOSIS — O26.20 RECURRENT PREGNANCY LOSS, CURRENTLY PREGNANT: ICD-10-CM

## 2022-10-13 DIAGNOSIS — Z84.89 FAMILY HISTORY OF GENETIC DISORDER: Primary | ICD-10-CM

## 2022-10-13 NOTE — TELEPHONE ENCOUNTER
Received notice from patient's insurance that no authorization is required for peripheral chromosome analysis (Confirmation #[id number])  Reviewed her medical policy and it appears to be medically necessary  Called patient and confirmed date of birth  Discussed the above and that out of pocket cost would be dependant on any deductible she hasn't met yet or co-insurance  Order will be placed electronically for Isaiah Sanchez and she was instructed to go to any [facility] for her blood draw  Patient expressed verbal understanding and had no questions  Encouraged her to call with any concerns

## 2022-10-17 ENCOUNTER — OFFICE VISIT (OUTPATIENT)
Dept: PHYSICAL THERAPY | Facility: REHABILITATION | Age: 27
End: 2022-10-17
Payer: COMMERCIAL

## 2022-10-17 DIAGNOSIS — R10.2 PELVIC PRESSURE IN PREGNANCY: Primary | ICD-10-CM

## 2022-10-17 DIAGNOSIS — R19.8 ABDOMINAL WEAKNESS: ICD-10-CM

## 2022-10-17 DIAGNOSIS — O26.899 PELVIC PRESSURE IN PREGNANCY: Primary | ICD-10-CM

## 2022-10-17 PROCEDURE — 97530 THERAPEUTIC ACTIVITIES: CPT

## 2022-10-17 PROCEDURE — 97110 THERAPEUTIC EXERCISES: CPT

## 2022-10-17 NOTE — PROGRESS NOTES
Daily Note     Today's date: 10/17/2022  Patient name: Royal Das  : 1995  MRN: [id number]  Referring provider: Apple Harrell MD  Dx:   Encounter Diagnosis     ICD-10-CM    1  Pelvic pressure in pregnancy  O26 899     R10 2    2  Abdominal weakness  R19 8        Start Time: 1500  Stop Time: 3882  Total time in clinic (min): 55 minutes    Subjective: Pt denies any changes but does experience some R low back and sciatic pain at times  Objective: See treatment diary below    Access Code: [email or web address]  URL: [email or web address]  Date: 10/17/2022  Prepared by: Cathie Brito    Program Notes   Don't forget, Burak Butcher can go on both sides :)    Exercises  · Seated Flexion Stretch with Swiss Ball - 1 x daily - 7 x weekly - 3 sets - 10 reps  · Wall Squat with Swiss Ball - 1 x daily - 7 x weekly - 3 sets - 10 reps  · Pelvic Circles on Swiss Ball - 1 x daily - 7 x weekly - 3 sets - 10 reps  · Swiss Ball Knee Extension - 1 x daily - 7 x weekly - 3 sets - 10 reps  · Tall Kneeling Hip Hinge - 1 x daily - 7 x weekly - 3 sets - 10 reps  · Supine Bridge - 1 x daily - 7 x weekly - 3 sets - 10 reps  · Seated Sciatic Tensioner - 1 x daily - 7 x weekly - 1 sets - 10 reps    Assessment: Tolerated treatment well  Patient would benefit from continued PT  Good tolerance to first session following IE  Discussed transfer strategies and also when caring for her daughter Burak Butcher  Plan: Continue per plan of care        Precautions: standard        10/10/2022 10/17/2022         Patient Ed           PFM anatomy and function KH          Pressure system  [facility]          Abdominal brace  [facility]          Positioning in bed Discussed           "knack"           Urge deferral (in shower) [facility]           Bladder habits and health  [facility]                                           Neuro Re-Ed           STS with core activation  [facility] x10 x10                                                                           Ther Ex Warm-up   Bike L4 8         pball mobility            LAQ/Marches on ball   20x         Quadruped alt arms   20x         Cat cow   10x         Heel sit to tall kneel   20x         PBall squats  x20                    Ther Activity           transfers  8'                    Manual           PFM exam                                                        Modalities

## 2022-10-20 NOTE — PATIENT INSTRUCTIONS
Thank you for choosing us for your  care today  If you have any questions about your ultrasound or care, please do not hesitate to contact us or your primary obstetrician  Some general instructions for your pregnancy are:    Protect against coronavirus: get vaccinated - pregnant women are increased risk of severe COVID  Notify your primary care doctor if you have any symptoms  Exercise: Aim for 22 minutes per day (150 minutes per week) of regular exercise  Walking is great! Nutrition: aim for calcium-rich and iron-rich foods as well as healthy sources of protein  Learn about Preeclampsia: preeclampsia is a common, serious high blood pressure complication in pregnancy  A blood pressure of 122MNFY (systolic or top number) or 96JUKB (diastolic or bottom number) is not normal and needs evaluation by your doctor  Aspirin is sometimes prescribed in early pregnancy to prevent preeclampsia in women with risk factors - ask your obstetrician if you should be on this medication  If you smoke, try to reduce how many cigarettes you smoke or try to quit completely  Do not vape  Other warning signs to watch out for in pregnancy or postpartum: chest pain, obstructed breathing or shortness of breath, seizures, thoughts of hurting yourself or your baby, bleeding, a painful or swollen leg, fever, or headache (see [facility] POST-BIRTH Warning Signs campaign)  If these happen call 911  Itching is also not normal in pregnancy and if you experience this, especially over your hands and feet, potentially worse at night, notify your doctors

## 2022-10-23 NOTE — PROGRESS NOTES
Daily Note     Today's date: 10/24/2022  Patient name: Jose R Jackson  : 1995  MRN: [id number]  Referring provider: Eunice Iqzuierdo MD  Dx:   Encounter Diagnosis     ICD-10-CM    1  Pelvic pressure in pregnancy  O26 899     R10 2    2  Abdominal weakness  R19 8    3  Prenatal care, subsequent pregnancy, first trimester  Z34 81        Start Time: 1414  Stop Time: 1500  Total time in clinic (min): 55 minutes     Jose R Jackson is a 24 y o  female who prefers she/her pronouns  They present to pelvic floor physical therapy with the primary complaint of pelvic girdle pain and pelvic pressure during pregnancy  They are referred to OPPT by Dr Jaciel Wells reports she is feeling good so far  During first pregnancy had a lot of hip and pelvic pain starting in second trimester that progressively got worse  Had pubic symphysis pain with first   Her mom has prolapse and is concerned about this for herself  Right now stating to feel a little pressure and would like to get started to prevent the discomfort she had last time        Due Date: March 15 2023    Subjective: Pt feels the urge deferral strategy works  Continues to have R buttock pain and is making attempts to hold daughter on both sides vs one side  Objective: See treatment diary below    Access Code: [email or web address]  URL: [email or web address]  Date: 10/17/2022  Prepared by: Franc Cuellar    Program Notes   Don't forget, Paty Less can go on both sides :)    Exercises  · Seated Flexion Stretch with Swiss Ball - 1 x daily - 7 x weekly - 3 sets - 10 reps  · Wall Squat with Swiss Ball - 1 x daily - 7 x weekly - 3 sets - 10 reps  · Pelvic Circles on Swiss Ball - 1 x daily - 7 x weekly - 3 sets - 10 reps  · Swiss Ball Knee Extension - 1 x daily - 7 x weekly - 3 sets - 10 reps  · Tall Kneeling Hip Hinge - 1 x daily - 7 x weekly - 3 sets - 10 reps  · Supine Bridge - 1 x daily - 7 x weekly - 3 sets - 10 reps  · Seated Sciatic Tensioner - 1 x daily - 7 x weekly - 1 sets - 10 reps    Assessment: Tolerated treatment well  Patient would benefit from continued PT  Good tolerance to today's session with addition of standing exercises  May benefit from more hip strengthening exercises  Goals  STG to be completed in 8 weeks from 10/10/2022:  1  Celestine Mensah will be independent with initial home exercise program    2  Celestine Mensah will demonstrate ability to contract, relax and actively lengthen PFM  3  Celestine Mensah will be independent with abdominal brace with position changes  4  Celestine Mensah will report pain no greater than 5/10 with all functional activity to allow Niki to return to maintain level of function  5  Celestine Mensah will be independent with urge deferral strategies  LTG to be completed in 12 weeks from 10/10/2022 or upon discharge from PFPT:  1  Celestine Mensah will be independent with advanced home exercise program for self-management of symptoms  2  Celestine Mensah will demonstrate ability to appropriately activate deep core muscles with functional mobility tasks  3  Celestine Mensah will be able to wear no pad or liner to decrease risk of urinary tract infection  4  Celestine Mensah will report pain no greater than 3/10 with all functional activity to allow Niki to return to maintain level of function  3  Celestine Mensah will report being able to participate in vaginal intercourse with no pain to allow patient improved quality of life  5  Celestine Mensah will be able to participate in pain-free vaginal examination to allow for health maintenance and monitoring  10  Celestine Mensah will participate in early labor positioning education  Plan: Continue per plan of care        Precautions: standard        10/10/2022 10/17/2022         Patient Ed           PFM anatomy and function KH          Pressure system  [address]          Abdominal brace  [address]          Positioning in bed Discussed           "knack"           Urge deferral (in shower) [address]           Bladder habits and health  [address]                                           Neuro Re-Ed           STS with core activation  KH x10 x10                                                                           Ther Ex           Warm-up   Bike L4 8 L4 8'        pball mobility            LAQ/Marches on ball     20x         Bicep curls on PBall   4# 2x10        Scaption on Pball   2# 2x10                   Quadruped alt arms   20x 20x        Cat cow   10x         Heel sit to tall kneel   20x 20x w/ 4# side to side rotation        PBall squats  x20 x20        Quadriped hip hinge   x10        Side step up   20x        Walking plank   Up and down bar        Standing shoulder flexion / chest press/ rows   YCO 2x10        Standing multifidus   YCO 10x each                   Ther Activity           transfers  8'                    Manual           PFM exam            MET   + shotgun        R piriformis MFR   5'                              Modalities

## 2022-10-24 ENCOUNTER — OFFICE VISIT (OUTPATIENT)
Dept: PHYSICAL THERAPY | Facility: REHABILITATION | Age: 27
End: 2022-10-24
Payer: COMMERCIAL

## 2022-10-24 DIAGNOSIS — R19.8 ABDOMINAL WEAKNESS: ICD-10-CM

## 2022-10-24 DIAGNOSIS — Z34.81 PRENATAL CARE, SUBSEQUENT PREGNANCY, FIRST TRIMESTER: ICD-10-CM

## 2022-10-24 DIAGNOSIS — R10.2 PELVIC PRESSURE IN PREGNANCY: Primary | ICD-10-CM

## 2022-10-24 DIAGNOSIS — O26.899 PELVIC PRESSURE IN PREGNANCY: Primary | ICD-10-CM

## 2022-10-24 PROCEDURE — 97140 MANUAL THERAPY 1/> REGIONS: CPT

## 2022-10-24 PROCEDURE — 97110 THERAPEUTIC EXERCISES: CPT

## 2022-10-25 ENCOUNTER — ROUTINE PRENATAL (OUTPATIENT)
Dept: PERINATAL CARE | Facility: OTHER | Age: 27
End: 2022-10-25
Payer: COMMERCIAL

## 2022-10-25 ENCOUNTER — ROUTINE PRENATAL (OUTPATIENT)
Dept: OBGYN CLINIC | Facility: CLINIC | Age: 27
End: 2022-10-25

## 2022-10-25 VITALS
HEART RATE: 69 BPM | WEIGHT: 177.4 LBS | HEIGHT: 66 IN | SYSTOLIC BLOOD PRESSURE: 104 MMHG | BODY MASS INDEX: 28.51 KG/M2 | DIASTOLIC BLOOD PRESSURE: 70 MMHG

## 2022-10-25 VITALS — WEIGHT: 175.4 LBS | SYSTOLIC BLOOD PRESSURE: 102 MMHG | BODY MASS INDEX: 28.31 KG/M2 | DIASTOLIC BLOOD PRESSURE: 64 MMHG

## 2022-10-25 DIAGNOSIS — Z3A.19 19 WEEKS GESTATION OF PREGNANCY: Primary | ICD-10-CM

## 2022-10-25 DIAGNOSIS — Z36.3 ENCOUNTER FOR ANTENATAL SCREENING FOR MALFORMATIONS: ICD-10-CM

## 2022-10-25 DIAGNOSIS — O44.42 LOW LYING PLACENTA NOS OR WITHOUT HEMORRHAGE, SECOND TRIMESTER: ICD-10-CM

## 2022-10-25 DIAGNOSIS — O09.299 HISTORY OF MACROSOMIA IN INFANT IN PRIOR PREGNANCY, CURRENTLY PREGNANT: Primary | ICD-10-CM

## 2022-10-25 DIAGNOSIS — Z36.86 ENCOUNTER FOR ANTENATAL SCREENING FOR CERVICAL LENGTH: ICD-10-CM

## 2022-10-25 DIAGNOSIS — O09.299 HISTORY OF MACROSOMIA IN INFANT IN PRIOR PREGNANCY, CURRENTLY PREGNANT: ICD-10-CM

## 2022-10-25 DIAGNOSIS — Z3A.19 19 WEEKS GESTATION OF PREGNANCY: ICD-10-CM

## 2022-10-25 DIAGNOSIS — Z82.79 FAMILY HISTORY OF AUTOSOMAL TRANSLOCATION: ICD-10-CM

## 2022-10-25 LAB
SL AMB  POCT GLUCOSE, UA: NORMAL
SL AMB POCT URINE PROTEIN: NORMAL

## 2022-10-25 PROCEDURE — 76805 OB US >/= 14 WKS SNGL FETUS: CPT | Performed by: OBSTETRICS & GYNECOLOGY

## 2022-10-25 PROCEDURE — 76817 TRANSVAGINAL US OBSTETRIC: CPT | Performed by: OBSTETRICS & GYNECOLOGY

## 2022-10-25 NOTE — PROGRESS NOTES
19 weeks gestation of pregnancy  Holly Ackerman  is a 24 y o  [id number] @19w6d who presents for routine prenatal visit  Denies OB complaints  NIPT- low risk  MASFP- pt forgot to complete  Level II - completed this AM; normal anatomy and growth; low lying placenta; f/u scheduled for 30-32 wks    Good fetal movement  Denies contractions, cramping, leakage of fluid or vaginal bleeding  Reviewed PTL precautions and reasons to call          History of macrosomia in infant in prior pregnancy, currently pregnant  Has 30-32 wk growth scan scheduled    Low lying placenta nos or without hemorrhage, second trimester  Posterior 1 7cm from the cervix @ 19 weeks  Has f/u scan scheduled for 30-32 wks  Discussed pelvic rest until placental location is reassessed

## 2022-10-25 NOTE — ASSESSMENT & PLAN NOTE
Gloria Kraft  is a 24 y o  [id number] @19w6d who presents for routine prenatal visit  Denies OB complaints  NIPT- low risk  MASFP- pt forgot to complete  Level II - completed this AM; normal anatomy and growth; low lying placenta; f/u scheduled for 30-32 wks    Good fetal movement  Denies contractions, cramping, leakage of fluid or vaginal bleeding  Reviewed PTL precautions and reasons to call

## 2022-10-25 NOTE — PROGRESS NOTES
Feels well, she has no complaints   Nl FM  Denies any bleeding or cramping   GA 19w6d  Pt had Level II today  It's a Boy   Pt will do AFP this week   S/P Flu vaccine

## 2022-10-25 NOTE — ASSESSMENT & PLAN NOTE
Posterior 1 7cm from the cervix @ 19 weeks  Has f/u scan scheduled for 30-32 wks  Discussed pelvic rest until placental location is reassessed

## 2022-10-25 NOTE — PROGRESS NOTES
[address]: Ms Ulysses Campos was seen today at 19w6d for anatomic survey and cervical length screening ultrasound  See ultrasound report under "OB Procedures" tab    Please don't hesitate to contact our office with any concerns or questions   -Edgardo Metz

## 2022-10-25 NOTE — PROGRESS NOTES
Ultrasound Probe Disinfection    A transvaginal ultrasound was performed  Prior to use, disinfection was performed with High Level Disinfection Process ([employer])  Probe serial number F1: [id number] was used        Janet Maldonado  10/25/22  8:14 AM

## 2022-10-25 NOTE — LETTER
October 25, 2022     Violetta Salamanca PA-C  [address] [facility] [id number]    Patient: Reynaldo Amaya   YOB: 1995   Date of Visit: 10/25/2022       Dear Nella Gonzalez: Thank you for referring Reynaldo Amaya to me for evaluation  Below are my notes for this consultation  If you have questions, please do not hesitate to call me  I look forward to following your patient along with you  Sincerely,        Tye Fishman MD        CC: No Recipients  Tye Fishman MD  10/25/2022  8:08 AM  Sign when Signing Visit  [address]: Ms Carron Aschoff was seen today at 19w6d for anatomic survey and cervical length screening ultrasound  See ultrasound report under "OB Procedures" tab    Please don't hesitate to contact our office with any concerns or questions   -Tye Fishman

## 2022-11-02 LAB
CELLS ANALYZED: 20
CELLS COUNTED AMN: 20
CELLS KARYOTYPED.TOTAL BLD/T: 2
CLINICAL CYTOGENETICIST SPEC: NORMAL
ISCN BAND LEVEL QL: 500
KARYOTYP BLD/T: NORMAL
KARYOTYP BLD/T: NORMAL
SPECIMEN SOURCE: NORMAL

## 2022-11-07 ENCOUNTER — OFFICE VISIT (OUTPATIENT)
Dept: PHYSICAL THERAPY | Facility: REHABILITATION | Age: 27
End: 2022-11-07

## 2022-11-07 ENCOUNTER — TELEPHONE (OUTPATIENT)
Dept: PERINATAL CARE | Facility: OTHER | Age: 27
End: 2022-11-07

## 2022-11-07 DIAGNOSIS — O26.899 PELVIC PRESSURE IN PREGNANCY: Primary | ICD-10-CM

## 2022-11-07 DIAGNOSIS — Z34.81 PRENATAL CARE, SUBSEQUENT PREGNANCY, FIRST TRIMESTER: ICD-10-CM

## 2022-11-07 DIAGNOSIS — R10.2 PELVIC PRESSURE IN PREGNANCY: Primary | ICD-10-CM

## 2022-11-07 DIAGNOSIS — R19.8 ABDOMINAL WEAKNESS: ICD-10-CM

## 2022-11-07 NOTE — TELEPHONE ENCOUNTER
LVM for pt regarding her new appt time on 1/17 in the [place] office to 9:45   Any questions [place] phone number was given to pt

## 2022-11-07 NOTE — PROGRESS NOTES
Daily Note     Today's date: 2022  Patient name: Segun Schulz  : 1995  MRN: [id number]  Referring provider: Aniyah Castro MD  Dx:   Encounter Diagnosis     ICD-10-CM    1  Pelvic pressure in pregnancy  O26 899     R10 2    2  Abdominal weakness  R19 8    3  Prenatal care, subsequent pregnancy, first trimester  Z34 81        Start Time: 1500  Stop Time: 1600  Total time in clinic (min): 60 minutes    Subjective: Pt reports since getting pillow for between knees she has had no pain  Is able to wake up with no pain now  Feeling much better  Has had no pelvic pressure or pain  History of Present Illness: Segun Schulz is a 24 y o  female who prefers she/her pronouns  They present to pelvic floor physical therapy with the primary complaint of pelvic girdle pain and pelvic pressure during pregnancy  They are referred to OPPT by Dr Alyssa Ruby  Francia Zakia reports she is feeling good so far  During first pregnancy had a lot of hip and pelvic pain starting in second trimester that progressively got worse  Had pubic symphysis pain with first   Her mom has prolapse and is concerned about this for herself  Right now stating to feel a little pressure and would like to get started to prevent the discomfort she had last time  Due Date: March 15 2023    Goals  STG to be completed in 8 weeks from 10/10/2022:  1  Francia Koehler will be independent with initial home exercise program    2  Francia Koehler will demonstrate ability to contract, relax and actively lengthen PFM  3  Francia Koehler will be independent with abdominal brace with position changes  4  Francia Koehler will report pain no greater than 5/10 with all functional activity to allow Niki to return to maintain level of function  5  Francia Koehler will be independent with urge deferral strategies  LTG to be completed in 12 weeks from 10/10/2022 or upon discharge from PFPT:  1   Francia Koehler will be independent with advanced home exercise program for self-management of symptoms  2  Nerissa Renteria will demonstrate ability to appropriately activate deep core muscles with functional mobility tasks  3  Nerissa Renteria will be able to wear no pad or liner to decrease risk of urinary tract infection  4  Nerissa Renteria will report pain no greater than 3/10 with all functional activity to allow Niki to return to maintain level of function  3  Nerissa Renteria will report being able to participate in vaginal intercourse with no pain to allow patient improved quality of life  5  Nerissa Renteria will be able to participate in pain-free vaginal examination to allow for health maintenance and monitoring  10  Nerissa Renteria will participate in early labor positioning education  Objective: See treatment diary below      Assessment: Luciansina Jovel tolerated today's treatment session well  Patient education provided on progression of strengthening TE  Nerissa Renteria completed all TE with good form and no adverse reactions  Pt reports good improvement in symptoms  Symptoms of pelvic pressure and pain has resolved and she is feeling very good  Pt will be placed on hold for next 30 days and will call if she needs to return prior to then  Nerissa Renteria continues to benefit from skilled OPPT services to address pelvic pain and pressure suring pregnancy  Will continue to address functional deficits and focus on progression of POC per patient tolerance  Plan: Continue per plan of care  Progress treatment as tolerated  hold patient for 30 days     Precautions: standard     Access Code: [email or web address]  URL: [email or web address]  Date: 11/07/2022  Prepared by: Klarissa Bob    Exercises  · Row with Core Activation - 1 x daily - 1 sets - 20 reps  · Shoulder Extension with Core Activation - 1 x daily - 1 sets - 20 reps  · Standing Anti-Rotation Press with Anchored Resistance - 1 x daily - 1 sets - 20 reps  · Bicep Curl to Overhead Press - 1 x daily - 2 sets - 10 reps  · Standing Shoulder Scaption with Dumbbells - 1 x daily - 2 sets - 10 reps  · Tall Kneel Vertical Bridge - 1 x daily - 2 sets - 10 reps  · Quadruped Alternating Arm Lift - 1 x daily - 3 sets - 10 reps  · Cat Cow - 1 x daily - 3 sets - 10 reps  · Child's Pose Stretch - 1-2 x daily - 1-2 min hold         10/10/2022 10/17/2022  11/7/2022        Patient Ed           PFM anatomy and function KH          Pressure system  KH          Abdominal brace  KH          Positioning in bed Discussed           "knack"           Urge deferral (in shower) [address]           Bladder habits and health  [address]          Early labor positions     [address]       Perineal massage     KH                   Neuro Re-Ed           STS with core activation  KH x10 x10                                                                           Ther Ex           Warm-up   Bike L4 8 L4 8' Bike level 5 8 min        pball mobility            LAQ/Marches on ball     20x         Bicep curls on PBall   4# 2x10 5# 2x15       Scaption on Pball   2# 2x10 3# 2x15                  Quadruped alt arms   20x 20x 20x        Cat cow   10x         Heel sit to tall kneel   20x 20x w/ 4# side to side rotation 20x with 5# side to side rotation        PBall squats  x20 x20        Quadriped hip hinge   x10        Side step up   20x        Walking plank   Up and down bar        Standing shoulder flexion / chest press/ rows   YCO 2x10 Row CO red x20, ext CO yellow x20       Standing multifidus   YCO 10x each pallof press yellow x15                  Ther Activity           transfers  8'                    Manual           PFM exam            MET   + shotgun        R piriformis MFR   5'                              Modalities

## 2022-11-11 ENCOUNTER — TELEPHONE (OUTPATIENT)
Dept: PERINATAL CARE | Facility: CLINIC | Age: 27
End: 2022-11-11

## 2022-11-11 NOTE — TELEPHONE ENCOUNTER
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

## 2022-11-14 ENCOUNTER — APPOINTMENT (OUTPATIENT)
Dept: PHYSICAL THERAPY | Facility: REHABILITATION | Age: 27
End: 2022-11-14

## 2022-11-21 ENCOUNTER — APPOINTMENT (OUTPATIENT)
Dept: PHYSICAL THERAPY | Facility: REHABILITATION | Age: 27
End: 2022-11-21

## 2022-11-21 ENCOUNTER — ROUTINE PRENATAL (OUTPATIENT)
Dept: OBGYN CLINIC | Facility: CLINIC | Age: 27
End: 2022-11-21

## 2022-11-21 VITALS — WEIGHT: 184 LBS | BODY MASS INDEX: 29.7 KG/M2 | SYSTOLIC BLOOD PRESSURE: 104 MMHG | DIASTOLIC BLOOD PRESSURE: 62 MMHG

## 2022-11-21 DIAGNOSIS — Z3A.23 23 WEEKS GESTATION OF PREGNANCY: ICD-10-CM

## 2022-11-21 DIAGNOSIS — O44.42 LOW LYING PLACENTA NOS OR WITHOUT HEMORRHAGE, SECOND TRIMESTER: ICD-10-CM

## 2022-11-21 DIAGNOSIS — O09.299 HISTORY OF MACROSOMIA IN INFANT IN PRIOR PREGNANCY, CURRENTLY PREGNANT: Primary | ICD-10-CM

## 2022-11-21 NOTE — PROGRESS NOTES
24 y o  [id number] female at 19w6d (Estimated Date of Delivery: 3/15/23) for PNV  Pre- Vitals    Flowsheet Row Most Recent Value   Prenatal Assessment    Fetal Heart Rate 155   Movement Present   Prenatal Vitals    Blood Pressure 104/62   Weight - Scale 83 5 kg (184 lb)   Urine Albumin/Glucose    Dilation/Effacement/Station    Vaginal Drainage    Edema    LLE Edema Trace   RLE Edema Trace   Facial Edema None        TW 3 kg (25 lb)  Denies contractions/lof or bleeding  Good FM  Reviewed low lying placenta, pelvic rest, orgasm  28 wk labs ordered with varicella  Reviewed practice providers, delivery location, appt schedule  RTO in 4 weeks

## 2022-11-21 NOTE — PROGRESS NOTES
Pt is a transfer from [facility]  Blood work for next trimester ordered  She does want to talk about pelvic rest- recommended by previous physician

## 2022-12-01 ENCOUNTER — TELEPHONE (OUTPATIENT)
Dept: PERINATAL CARE | Facility: CLINIC | Age: 27
End: 2022-12-01

## 2022-12-01 NOTE — TELEPHONE ENCOUNTER
Spoke with patient and confirmed her ultrasound MFM appointment had to be rescheduled 1/17 10am to [place] office  Patient verbalized understanding of new time, date and location of appointment  Patient denies further questions

## 2022-12-12 ENCOUNTER — TELEPHONE (OUTPATIENT)
Dept: PHYSICAL THERAPY | Facility: REHABILITATION | Age: 27
End: 2022-12-12

## 2022-12-12 NOTE — TELEPHONE ENCOUNTER
Spoke to Chad Krause, she is doing well  Prefers not to be discharged at this time in case she needs us in the next few weeks

## 2023-01-04 ENCOUNTER — ROUTINE PRENATAL (OUTPATIENT)
Dept: OBGYN CLINIC | Facility: CLINIC | Age: 28
End: 2023-01-04

## 2023-01-04 VITALS — DIASTOLIC BLOOD PRESSURE: 72 MMHG | BODY MASS INDEX: 31.31 KG/M2 | WEIGHT: 194 LBS | SYSTOLIC BLOOD PRESSURE: 122 MMHG

## 2023-01-04 DIAGNOSIS — O09.299 HISTORY OF MACROSOMIA IN INFANT IN PRIOR PREGNANCY, CURRENTLY PREGNANT: Primary | ICD-10-CM

## 2023-01-04 DIAGNOSIS — Z23 NEED FOR TDAP VACCINATION: ICD-10-CM

## 2023-01-04 DIAGNOSIS — Z3A.30 30 WEEKS GESTATION OF PREGNANCY: ICD-10-CM

## 2023-01-04 NOTE — PROGRESS NOTES
This is a 24 y o  [id number] at 30w0d who presents for return OB visit  No complaints  Denies contractions, leakage, bleeding  Endorses fetal movement  28 wk encouraged  Pt  Could not complete previously as she was sick  [address] reviewed  Skin to skin, rooming in, delayed cord clamp,  pain management in labor discussed  Consent signed  Full code   OK with blood transfusion   Growth u/s and placenta check in 2 weeks

## 2023-01-04 NOTE — PROGRESS NOTES
Red folder today  TDAP offered and accepted  TDAP given in right deltoid without difficulty, patient tolerated shot well  Breast pump - patient did already on her own  Urine dip neg/neg

## 2023-01-05 ENCOUNTER — LAB (OUTPATIENT)
Dept: LAB | Facility: CLINIC | Age: 28
End: 2023-01-05

## 2023-01-05 DIAGNOSIS — O44.42 LOW LYING PLACENTA NOS OR WITHOUT HEMORRHAGE, SECOND TRIMESTER: ICD-10-CM

## 2023-01-05 DIAGNOSIS — O09.299 HISTORY OF MACROSOMIA IN INFANT IN PRIOR PREGNANCY, CURRENTLY PREGNANT: ICD-10-CM

## 2023-01-05 DIAGNOSIS — Z3A.23 23 WEEKS GESTATION OF PREGNANCY: ICD-10-CM

## 2023-01-05 LAB
ERYTHROCYTE [DISTWIDTH] IN BLOOD BY AUTOMATED COUNT: 13.5 % (ref 11.6–15.1)
GLUCOSE 1H P 50 G GLC PO SERPL-MCNC: 98 MG/DL (ref 40–134)
HCT VFR BLD AUTO: 37.1 % (ref 34.8–46.1)
HGB BLD-MCNC: 12.7 G/DL (ref 11.5–15.4)
MCH RBC QN AUTO: 30.7 PG (ref 26.8–34.3)
MCHC RBC AUTO-ENTMCNC: 34.2 G/DL (ref 31.4–37.4)
MCV RBC AUTO: 90 FL (ref 82–98)
PLATELET # BLD AUTO: 177 THOUSANDS/UL (ref 149–390)
PMV BLD AUTO: 10.7 FL (ref 8.9–12.7)
RBC # BLD AUTO: 4.14 MILLION/UL (ref 3.81–5.12)
VZV IGG SER QL IA: NORMAL
WBC # BLD AUTO: 9.89 THOUSAND/UL (ref 4.31–10.16)

## 2023-01-06 LAB — RPR SER QL: NORMAL

## 2023-01-06 NOTE — RESULT ENCOUNTER NOTE
Congratulations! You have passed your glucose test  Your blood count looks good  Please contact the office at [phone] with any questions

## 2023-01-17 ENCOUNTER — ULTRASOUND (OUTPATIENT)
Facility: HOSPITAL | Age: 28
End: 2023-01-17

## 2023-01-17 VITALS
WEIGHT: 197.4 LBS | HEART RATE: 108 BPM | BODY MASS INDEX: 31.72 KG/M2 | DIASTOLIC BLOOD PRESSURE: 80 MMHG | HEIGHT: 66 IN | SYSTOLIC BLOOD PRESSURE: 98 MMHG

## 2023-01-17 DIAGNOSIS — O44.42 LOW LYING PLACENTA NOS OR WITHOUT HEMORRHAGE, SECOND TRIMESTER: Primary | ICD-10-CM

## 2023-01-17 DIAGNOSIS — Z36.2 ENCOUNTER FOR FOLLOW-UP ULTRASOUND OF FETAL ANATOMY: ICD-10-CM

## 2023-01-17 DIAGNOSIS — Z3A.31 31 WEEKS GESTATION OF PREGNANCY: ICD-10-CM

## 2023-01-17 DIAGNOSIS — Z36.89 ENCOUNTER FOR ULTRASOUND TO ASSESS FETAL GROWTH: ICD-10-CM

## 2023-01-17 DIAGNOSIS — O09.299 HISTORY OF MACROSOMIA IN INFANT IN PRIOR PREGNANCY, CURRENTLY PREGNANT: ICD-10-CM

## 2023-01-17 PROBLEM — R10.2 PELVIC PRESSURE IN PREGNANCY: Status: RESOLVED | Noted: 2022-09-28 | Resolved: 2023-01-17

## 2023-01-17 PROBLEM — O26.899 PELVIC PRESSURE IN PREGNANCY: Status: RESOLVED | Noted: 2022-09-28 | Resolved: 2023-01-17

## 2023-01-17 NOTE — PROGRESS NOTES
Ultrasound Probe Disinfection    A transvaginal ultrasound was performed  Prior to use, disinfection was performed with High Level Disinfection Process ([employer])  Probe serial number A4: [id number] was used        Kofi Graves 01/17/23  10:09 AM

## 2023-01-17 NOTE — PATIENT INSTRUCTIONS
Thank you for choosing us for your  care today  If you have any questions about your ultrasound or care, please do not hesitate to contact us or your primary obstetrician  Some general instructions for your pregnancy are:    Exercise: Aim for 22 minutes per day (150 minutes per week) of regular exercise  Walking is great! Nutrition: Choose healthy sources of calcium, iron, and protein  Learn about Preeclampsia: preeclampsia is a common, serious high blood pressure complication in pregnancy  A blood pressure of 953AQND (systolic or top number) or 79GHZM (diastolic or bottom number) is not normal and needs evaluation by your doctor  Aspirin is sometimes prescribed in early pregnancy to prevent preeclampsia in women with risk factors - ask your obstetrician if you should be on this medication  For more resources, visit:  https://mothertobaby org/fact-sheets/low-dose-aspirin/  [employer] com cy  https://www highriskpregnancyinfo org/preeclampsia  If you smoke, try to reduce how many cigarettes you smoke or try to quit completely  Do not vape  Other warning signs to watch out for in pregnancy or postpartum: chest pain, obstructed breathing or shortness of breath, seizures, thoughts of hurting yourself or your baby, bleeding, a painful or swollen leg, fever, or headache (see [employer] POST-BIRTH Warning Signs campaign)  If these happen call 911  Itching is also not normal in pregnancy and if you experience this, especially over your hands and feet, potentially worse at night, notify your doctors

## 2023-01-17 NOTE — PROGRESS NOTES
[address] [place] [address] [facility] Rachael Tsang was seen today at 31w6d for followup placental location ultrasound with fetal growth measurement  See ultrasound report under "OB Procedures" tab    Please don't hesitate to contact our office with any concerns or questions   -Taj Galan MD

## 2023-01-17 NOTE — LETTER
2023     [address] [place] [address]    Patient: Rolo Bhatia   YOB: 1995   Date of Visit: 2023       Dear Dr Michell Gonzalez:    Low lying placenta has resolved and estimated fetal weight is normal  If you'd like her to have another growth US at around 36 weeks gestation (prior  of 9lb5oz baby) we are happy to, just ask her to call to schedule  Sincerely,        Kea Navarrete MD        CC: No Recipients  Kae Navarrete MD  2023 10:37 AM  Sign when Signing Visit  [address]: Rolo Bhatia was seen today at 31w6d for followup placental location ultrasound with fetal growth measurement  See ultrasound report under "OB Procedures" tab    Please don't hesitate to contact our office with any concerns or questions   -Kae Navarrete MD

## 2023-01-18 ENCOUNTER — ROUTINE PRENATAL (OUTPATIENT)
Dept: OBGYN CLINIC | Facility: CLINIC | Age: 28
End: 2023-01-18

## 2023-01-18 VITALS — BODY MASS INDEX: 31.09 KG/M2 | WEIGHT: 192.6 LBS | DIASTOLIC BLOOD PRESSURE: 72 MMHG | SYSTOLIC BLOOD PRESSURE: 110 MMHG

## 2023-01-18 DIAGNOSIS — O09.299 HISTORY OF MACROSOMIA IN INFANT IN PRIOR PREGNANCY, CURRENTLY PREGNANT: Primary | ICD-10-CM

## 2023-01-18 DIAGNOSIS — Z3A.32 32 WEEKS GESTATION OF PREGNANCY: ICD-10-CM

## 2023-01-18 NOTE — PROGRESS NOTES
Pt is well, questions regarding circumcision and pelvic rest  Denies vaginal bleeding and leakage of fluid   Pt is here 32 week prenatal   Urine dip test neg protein/ neg glucose

## 2023-01-18 NOTE — PROGRESS NOTES
This is a 24 y o  [id number] at 32w0d who presents for return OB visit  No complaints  Denies contractions, leakage, bleeding   Endorses fetal movement   BP: 110/72 TWlb    Low lying placenta resolved  32 wk EFW 4#8 (66%)  F/up 2 wk

## 2023-02-02 ENCOUNTER — ROUTINE PRENATAL (OUTPATIENT)
Dept: OBGYN CLINIC | Facility: CLINIC | Age: 28
End: 2023-02-02

## 2023-02-02 VITALS — SYSTOLIC BLOOD PRESSURE: 110 MMHG | WEIGHT: 201 LBS | BODY MASS INDEX: 32.44 KG/M2 | DIASTOLIC BLOOD PRESSURE: 70 MMHG

## 2023-02-02 DIAGNOSIS — Z34.80 SUPERVISION OF OTHER NORMAL PREGNANCY, ANTEPARTUM: Primary | ICD-10-CM

## 2023-02-02 DIAGNOSIS — Z3A.34 34 WEEKS GESTATION OF PREGNANCY: ICD-10-CM

## 2023-02-02 NOTE — PROGRESS NOTES
24 y o  [id number]  female at 28 1 wga for PNV  BP : 110/70  TW  Patient is feeling well but has been having irregular contractions    She requests a cervical exam  SVE /-2  No leakage of fluid or bleeding  Reviewed  labor precautions  Follow-up in 2 weeks

## 2023-02-02 NOTE — PROGRESS NOTES
Routine prenatal, 34 weeks  Pt is well, has requested a cervical check, states there has been an increase in natasha omalley

## 2023-02-15 ENCOUNTER — ROUTINE PRENATAL (OUTPATIENT)
Dept: OBGYN CLINIC | Facility: CLINIC | Age: 28
End: 2023-02-15

## 2023-02-15 VITALS — BODY MASS INDEX: 32.67 KG/M2 | SYSTOLIC BLOOD PRESSURE: 118 MMHG | WEIGHT: 202.4 LBS | DIASTOLIC BLOOD PRESSURE: 80 MMHG

## 2023-02-15 DIAGNOSIS — Z78.9 NOT IMMUNE TO HEPATITIS B VIRUS: ICD-10-CM

## 2023-02-15 DIAGNOSIS — O09.299 HISTORY OF MACROSOMIA IN INFANT IN PRIOR PREGNANCY, CURRENTLY PREGNANT: ICD-10-CM

## 2023-02-15 DIAGNOSIS — Z3A.36 36 WEEKS GESTATION OF PREGNANCY: Primary | ICD-10-CM

## 2023-02-15 DIAGNOSIS — Z82.79 FAMILY HISTORY OF AUTOSOMAL TRANSLOCATION: ICD-10-CM

## 2023-02-15 NOTE — PROGRESS NOTES
Routine prenatal, 36 weeks  Pt will complete GBS and G/C  Pt is well, states there are no concerns at this time  Denies vaginal bleeding and leakage of fluid   Urine dip test neg protein/ neg glucose

## 2023-02-15 NOTE — PROGRESS NOTES
24 y o  [id number] female at 44w0d (Estimated Date of Delivery: 3/15/23) for PNV  Pre- Vitals    Flowsheet Row Most Recent Value   Prenatal Assessment    Fetal Heart Rate 140   Movement Present   Prenatal Vitals    Blood Pressure 118/80   Weight - Scale 91 8 kg (202 lb 6 4 oz)   Urine Albumin/Glucose    Dilation/Effacement/Station    Cervical Dilation 3   Cervical Effacement 70   Fetal Station -2   Vaginal Drainage    Edema         TW 7 kg (43 lb 6 4 oz)    Leakage of fluid: no  Vaginal bleeding: no  Contractions/Cramping: no  Fetal movement: yes    GBS done: Yes  PCN allergy: No  Chlamydia/gonorrhea swab done: Yes  Delivery plan: prefers non-medicated birth  Desires no epidural for pain management and to await spontaneous labor  SVE 3/70/-2, soft, posterior  Labor precautions given  [address] reviewed  RTO in 1 weeks

## 2023-02-16 LAB
C TRACH DNA SPEC QL NAA+PROBE: NEGATIVE
N GONORRHOEA DNA SPEC QL NAA+PROBE: NEGATIVE

## 2023-02-17 LAB — GP B STREP DNA SPEC QL NAA+PROBE: NEGATIVE

## 2023-02-21 ENCOUNTER — ROUTINE PRENATAL (OUTPATIENT)
Dept: OBGYN CLINIC | Facility: CLINIC | Age: 28
End: 2023-02-21

## 2023-02-21 VITALS — SYSTOLIC BLOOD PRESSURE: 122 MMHG | BODY MASS INDEX: 33.25 KG/M2 | WEIGHT: 206 LBS | DIASTOLIC BLOOD PRESSURE: 70 MMHG

## 2023-02-21 DIAGNOSIS — O09.299 HISTORY OF MACROSOMIA IN INFANT IN PRIOR PREGNANCY, CURRENTLY PREGNANT: ICD-10-CM

## 2023-02-21 DIAGNOSIS — Z3A.36 36 WEEKS GESTATION OF PREGNANCY: Primary | ICD-10-CM

## 2023-02-21 NOTE — PATIENT INSTRUCTIONS
Early Labor Signs   AMBULATORY CARE:   Early labor signs and symptoms  are the changes in your body that signal your baby is getting ready to be delivered  Early labor signs can happen weeks, days or hours before delivery  Call 911 for any of the following: You have heavy vaginal bleeding  You cannot get to the hospital before the baby starts to come out  Seek care immediately if:   You have regular, painful contractions that are less than 5 minutes apart and last 30 to 70 seconds each  You have a constant trickle or sudden gush of clear fluid from your vagina  You notice a sudden decrease in your baby's movement  Contact your obstetrician or healthcare provider if:   You have pain in your lower back or abdomen that does not get better when you change positions  You have questions or concerns about your condition or care  Early labor signs and symptoms:   Lightening  occurs when your baby drops inside your pelvis  You may feel increased pressure in your pelvis  This may happen a few weeks to a few hours before your labor begins  Contractions  are cramps and tightening that occur in your uterus to help move the baby through your birth canal  Contractions occur regularly and more often each time  Each one lasts about 30 to 70 seconds, and gets stronger until you deliver your baby  Contractions do not go away with movement  The pain usually starts in your lower back and moves to your abdomen  Effacement  occurs when your cervix softens and thins, so it can easily open for the baby  You will not be able to feel effacement  Your healthcare provider will examine your cervix for effacement  Dilation  is widening of your cervix  Your healthcare provider will examine your cervix for dilation  Your cervix may start to dilate weeks before your baby is delivered  Your cervix will be fully opened and ready for delivery when it is dilated to 10 centimeters       Increased discharge  from your vagina may occur  It may be brown, pink, clear, or slightly bloody  This discharge may also be called bloody show  Bloody show is a mucus plug that forms and blocks your cervix during pregnancy  The discharge may mean that your cervix is opening up and getting ready for delivery  Rupture of membranes  is a sudden release of clear fluid from your vagina  Ruptured membranes means your water broke  Your healthcare provider may need to break your water if it does not happen on its own  False labor: You may have false labor signs, which are also called [place] contractions  False labor is common and may happen several weeks or days before your actual labor  The contractions are not regular, and do not get closer together  The pain is usually mild, does not worsen, and is felt only in front  [place] contractions may happen later in the day, and stop after you change position, walk, or rest     Follow up with your obstetrician or healthcare provider as directed:  Write down your questions so you remember to ask them during your visit  © Copyright [facility] 2022 Information is for End User's use only and may not be sold, redistributed or otherwise used for commercial purposes  The above information is an  only  It is not intended as medical advice for individual conditions or treatments  Talk to your doctor, nurse or pharmacist before following any medical regimen to see if it is safe and effective for you

## 2023-02-21 NOTE — PROGRESS NOTES
24 y o  [id number] female at 36w7d (Estimated Date of Delivery: 3/15/23) for PNV  Pre- Vitals    Flowsheet Row Most Recent Value   Prenatal Assessment    Fetal Heart Rate 150   Movement Present   Prenatal Vitals    Blood Pressure 122/70   Weight - Scale 93 4 kg (206 lb)   Urine Albumin/Glucose    Dilation/Effacement/Station    Cervical Dilation 3   Cervical Effacement 70   Fetal Station -1   Vaginal Drainage    Edema         TW 3 kg (47 lb)    Leakage of fluid: no  Vaginal bleeding: no  Contractions/Cramping: yes  Fetal movement: yes  Patient c/o contractions last night  Not interested in induction  Labor precautions given  [address] reviewed  RTO in 1 weeks

## 2023-02-28 ENCOUNTER — ROUTINE PRENATAL (OUTPATIENT)
Dept: OBGYN CLINIC | Facility: CLINIC | Age: 28
End: 2023-02-28

## 2023-02-28 VITALS — BODY MASS INDEX: 33.41 KG/M2 | WEIGHT: 207 LBS | DIASTOLIC BLOOD PRESSURE: 64 MMHG | SYSTOLIC BLOOD PRESSURE: 116 MMHG

## 2023-02-28 DIAGNOSIS — Z3A.37 37 WEEKS GESTATION OF PREGNANCY: ICD-10-CM

## 2023-02-28 DIAGNOSIS — Z34.80 SUPERVISION OF OTHER NORMAL PREGNANCY, ANTEPARTUM: Primary | ICD-10-CM

## 2023-02-28 NOTE — PROGRESS NOTES
24 y o  [id number]  female at 41 7 wga for PNV  BP : 116/64  TW  Feeling well    SVE 3/70/-1  Desires to await spontaneous labor  Reviewed labor precautions and FKCs  F/u 1 week

## 2023-02-28 NOTE — PROGRESS NOTES
Undressed for a cervical check  Patient states she does have some irregular BH contraction but denies any fluid leaking or pelvic pressure

## 2023-03-09 ENCOUNTER — ROUTINE PRENATAL (OUTPATIENT)
Dept: OBGYN CLINIC | Facility: CLINIC | Age: 28
End: 2023-03-09

## 2023-03-09 VITALS — SYSTOLIC BLOOD PRESSURE: 108 MMHG | DIASTOLIC BLOOD PRESSURE: 74 MMHG | BODY MASS INDEX: 33.48 KG/M2 | WEIGHT: 207.4 LBS

## 2023-03-09 DIAGNOSIS — Z34.80 SUPERVISION OF OTHER NORMAL PREGNANCY, ANTEPARTUM: Primary | ICD-10-CM

## 2023-03-09 DIAGNOSIS — Z3A.39 39 WEEKS GESTATION OF PREGNANCY: ICD-10-CM

## 2023-03-09 NOTE — PROGRESS NOTES
Patient is a 23 YO [id number] female presenting to the office at 39 1 weeks for routine OB care  BP: 108/74  TWlb  Fetal Movement: yes good movement  Feeling well today  Denies LOF, VB  Reports some BH  SVE 3 /-1   Membranes swept  Patient declines IOL until 41 weeks  Reviewed precautions  Call for concerns  RTO 1 week

## 2023-03-09 NOTE — PROGRESS NOTES
Routine prenatal, 39 weeks, request cervical check  Pt is well, states there are no concerns at this time  Denies vaginal bleeding and leakage of fluid

## 2023-03-11 ENCOUNTER — HOSPITAL ENCOUNTER (INPATIENT)
Facility: HOSPITAL | Age: 28
LOS: 2 days | Discharge: HOME | End: 2023-03-13
Attending: OBSTETRICS & GYNECOLOGY | Admitting: OBSTETRICS & GYNECOLOGY

## 2023-03-11 ENCOUNTER — NURSE TRIAGE (OUTPATIENT)
Dept: OTHER | Facility: OTHER | Age: 28
End: 2023-03-11

## 2023-03-11 DIAGNOSIS — Z3A.39 39 WEEKS GESTATION OF PREGNANCY: Primary | ICD-10-CM

## 2023-03-11 LAB
ABO GROUP BLD: NORMAL
BLD GP AB SCN SERPL QL: NEGATIVE
ERYTHROCYTE [DISTWIDTH] IN BLOOD BY AUTOMATED COUNT: 13.5 % (ref 11.6–15.1)
HCT VFR BLD AUTO: 37.6 % (ref 34.8–46.1)
HGB BLD-MCNC: 12.9 G/DL (ref 11.5–15.4)
HOLD SPECIMEN: NORMAL
MCH RBC QN AUTO: 30.9 PG (ref 26.8–34.3)
MCHC RBC AUTO-ENTMCNC: 34.3 G/DL (ref 31.4–37.4)
MCV RBC AUTO: 90 FL (ref 82–98)
PLATELET # BLD AUTO: 170 THOUSANDS/UL (ref 149–390)
PMV BLD AUTO: 11.2 FL (ref 8.9–12.7)
RBC # BLD AUTO: 4.17 MILLION/UL (ref 3.81–5.12)
RH BLD: POSITIVE
SPECIMEN EXPIRATION DATE: NORMAL
WBC # BLD AUTO: 10.15 THOUSAND/UL (ref 4.31–10.16)

## 2023-03-11 RX ORDER — ONDANSETRON 2 MG/ML
4 INJECTION INTRAMUSCULAR; INTRAVENOUS EVERY 6 HOURS PRN
Status: DISCONTINUED | OUTPATIENT
Start: 2023-03-11 | End: 2023-03-13 | Stop reason: HOSPADM

## 2023-03-11 RX ORDER — BUPIVACAINE HYDROCHLORIDE 2.5 MG/ML
30 INJECTION, SOLUTION EPIDURAL; INFILTRATION; INTRACAUDAL ONCE AS NEEDED
Status: DISCONTINUED | OUTPATIENT
Start: 2023-03-11 | End: 2023-03-12

## 2023-03-11 RX ORDER — SODIUM CHLORIDE, SODIUM LACTATE, POTASSIUM CHLORIDE, CALCIUM CHLORIDE 600; 310; 30; 20 MG/100ML; MG/100ML; MG/100ML; MG/100ML
125 INJECTION, SOLUTION INTRAVENOUS CONTINUOUS
Status: DISCONTINUED | OUTPATIENT
Start: 2023-03-11 | End: 2023-03-13 | Stop reason: HOSPADM

## 2023-03-11 NOTE — TELEPHONE ENCOUNTER
Reason for Disposition  • Leakage of fluid from vagina    Answer Assessment - Initial Assessment Questions  1  ONSET: "When did the symptoms begin?"      5 PM thinks water broke- "underwear felt really really wet, saw wet spot on underwear " "whole crotch of underwear was wet",When wiping noted more than normal wet/clear discharge    2  CONTRACTIONS: "Are you having any contractions?" If yes, ask: "Describe the contractions that you are having " (e g , duration, frequency, regularity, severity)      Sporadic/irregular- pressure in lower back 2/10, been on and off all day    3  LYNDA: "What date are you expecting to deliver?"     3/15/23    4  PARITY: "Have you had a baby before?" If Yes, ask: "How long did the labor last?"      Second baby- Prior vaginal delivery     5  FETAL MOVEMENT: "Has the baby's movement decreased or changed significantly from normal?"      Normal fetal movement     6  OTHER SYMPTOMS: "Do you have any other symptoms?" (e g , abdominal pain, vaginal bleeding, fever, hand/facial swelling)     Denies vaginal bleeding, fever, denies any UTI symptoms      Protocols used: [place] [facility] [place]

## 2023-03-11 NOTE — TELEPHONE ENCOUNTER
On call provider contacted, recommends pt to be evaluated at [facility] on Labor and Delivery  Pt made aware and verbalized understanding

## 2023-03-11 NOTE — TELEPHONE ENCOUNTER
Regardin weeks contractions  ----- Message from Annelise Jacobson sent at 3/11/2023  6:30 PM EST -----  "I think I might my water may have broken about 5 pm  I have been having contractions all day they have been random I wanted to speak to someone"

## 2023-03-12 ENCOUNTER — ANESTHESIA EVENT (INPATIENT)
Dept: ANESTHESIOLOGY | Facility: HOSPITAL | Age: 28
End: 2023-03-12

## 2023-03-12 ENCOUNTER — ANESTHESIA (INPATIENT)
Dept: ANESTHESIOLOGY | Facility: HOSPITAL | Age: 28
End: 2023-03-12

## 2023-03-12 LAB
BASE EXCESS BLDCOA CALC-SCNC: -1.9 MMOL/L (ref 3–11)
BASE EXCESS BLDCOV CALC-SCNC: -2.2 MMOL/L (ref 1–9)
HCO3 BLDCOA-SCNC: 23.2 MMOL/L (ref 17.3–27.3)
HCO3 BLDCOV-SCNC: 25.4 MMOL/L (ref 12.2–28.6)
O2 CT VFR BLDCOA CALC: 18.6 ML/DL
OXYHGB MFR BLDCOA: 87.9 %
OXYHGB MFR BLDCOV: 58.4 %
PCO2 BLDCOA: 40.8 MM[HG] (ref 30–60)
PCO2 BLDCOV: 54.6 MM HG (ref 27–43)
PH BLDCOA: 7.37 [PH] (ref 7.23–7.43)
PH BLDCOV: 7.29 [PH] (ref 7.19–7.49)
PO2 BLDCOA: 45.8 MM HG (ref 5–25)
PO2 BLDCOV: 26.2 MM HG (ref 15–45)
SAO2 % BLDCOV: 12.7 ML/DL
TREPONEMA PALLIDUM IGG+IGM AB [PRESENCE] IN SERUM OR PLASMA BY IMMUNOASSAY: NORMAL

## 2023-03-12 PROCEDURE — 4A1HXCZ MONITORING OF PRODUCTS OF CONCEPTION, CARDIAC RATE, EXTERNAL APPROACH: ICD-10-PCS | Performed by: OBSTETRICS & GYNECOLOGY

## 2023-03-12 PROCEDURE — 3E033VJ INTRODUCTION OF OTHER HORMONE INTO PERIPHERAL VEIN, PERCUTANEOUS APPROACH: ICD-10-PCS | Performed by: OBSTETRICS & GYNECOLOGY

## 2023-03-12 RX ORDER — IBUPROFEN 600 MG/1
600 TABLET ORAL EVERY 6 HOURS
Status: DISCONTINUED | OUTPATIENT
Start: 2023-03-12 | End: 2023-03-13 | Stop reason: HOSPADM

## 2023-03-12 RX ORDER — KETOROLAC TROMETHAMINE 30 MG/ML
30 INJECTION, SOLUTION INTRAMUSCULAR; INTRAVENOUS EVERY 6 HOURS PRN
Status: DISCONTINUED | OUTPATIENT
Start: 2023-03-12 | End: 2023-03-13 | Stop reason: HOSPADM

## 2023-03-12 RX ORDER — DIPHENHYDRAMINE HCL 25 MG
25 TABLET ORAL EVERY 6 HOURS PRN
Status: DISCONTINUED | OUTPATIENT
Start: 2023-03-12 | End: 2023-03-13 | Stop reason: HOSPADM

## 2023-03-12 RX ORDER — LIDOCAINE HYDROCHLORIDE AND EPINEPHRINE 15; 5 MG/ML; UG/ML
INJECTION, SOLUTION EPIDURAL AS NEEDED
Status: DISCONTINUED | OUTPATIENT
Start: 2023-03-12 | End: 2023-03-12 | Stop reason: HOSPADM

## 2023-03-12 RX ORDER — CALCIUM CARBONATE 200(500)MG
1000 TABLET,CHEWABLE ORAL DAILY PRN
Status: DISCONTINUED | OUTPATIENT
Start: 2023-03-12 | End: 2023-03-13 | Stop reason: HOSPADM

## 2023-03-12 RX ORDER — MISOPROSTOL 200 UG/1
1000 TABLET ORAL ONCE
Status: COMPLETED | OUTPATIENT
Start: 2023-03-12 | End: 2023-03-12

## 2023-03-12 RX ORDER — ACETAMINOPHEN 325 MG/1
650 TABLET ORAL EVERY 4 HOURS PRN
Status: DISCONTINUED | OUTPATIENT
Start: 2023-03-12 | End: 2023-03-13 | Stop reason: HOSPADM

## 2023-03-12 RX ORDER — DIAPER,BRIEF,INFANT-TODD,DISP
1 EACH MISCELLANEOUS DAILY PRN
Status: DISCONTINUED | OUTPATIENT
Start: 2023-03-12 | End: 2023-03-13 | Stop reason: HOSPADM

## 2023-03-12 RX ORDER — OXYTOCIN/RINGER'S LACTATE 30/500 ML
1-30 PLASTIC BAG, INJECTION (ML) INTRAVENOUS
Status: DISCONTINUED | OUTPATIENT
Start: 2023-03-12 | End: 2023-03-13 | Stop reason: HOSPADM

## 2023-03-12 RX ORDER — DOCUSATE SODIUM 100 MG/1
100 CAPSULE, LIQUID FILLED ORAL 2 TIMES DAILY
Status: DISCONTINUED | OUTPATIENT
Start: 2023-03-12 | End: 2023-03-13 | Stop reason: HOSPADM

## 2023-03-12 RX ORDER — OXYTOCIN/RINGER'S LACTATE 30/500 ML
250 PLASTIC BAG, INJECTION (ML) INTRAVENOUS ONCE
Status: DISCONTINUED | OUTPATIENT
Start: 2023-03-12 | End: 2023-03-13 | Stop reason: HOSPADM

## 2023-03-12 RX ADMIN — DOCUSATE SODIUM 100 MG: 100 CAPSULE, LIQUID FILLED ORAL at 19:55

## 2023-03-12 RX ADMIN — Medication 2 MILLI-UNITS/MIN: at 01:49

## 2023-03-12 RX ADMIN — MISOPROSTOL 1000 MCG: 200 TABLET ORAL at 10:00

## 2023-03-12 RX ADMIN — SODIUM CHLORIDE, SODIUM LACTATE, POTASSIUM CHLORIDE, AND CALCIUM CHLORIDE 925 ML/HR: .6; .31; .03; .02 INJECTION, SOLUTION INTRAVENOUS at 08:20

## 2023-03-12 RX ADMIN — DOCUSATE SODIUM 100 MG: 100 CAPSULE, LIQUID FILLED ORAL at 13:14

## 2023-03-12 RX ADMIN — LIDOCAINE HYDROCHLORIDE AND EPINEPHRINE 2 ML: 15; 5 INJECTION, SOLUTION EPIDURAL at 09:09

## 2023-03-12 RX ADMIN — SODIUM CHLORIDE, SODIUM LACTATE, POTASSIUM CHLORIDE, AND CALCIUM CHLORIDE 125 ML/HR: .6; .31; .03; .02 INJECTION, SOLUTION INTRAVENOUS at 01:33

## 2023-03-12 RX ADMIN — Medication: at 09:36

## 2023-03-12 RX ADMIN — IBUPROFEN 600 MG: 600 TABLET, FILM COATED ORAL at 19:55

## 2023-03-12 RX ADMIN — IBUPROFEN 600 MG: 600 TABLET, FILM COATED ORAL at 13:14

## 2023-03-12 RX ADMIN — LIDOCAINE HYDROCHLORIDE AND EPINEPHRINE 3 ML: 15; 5 INJECTION, SOLUTION EPIDURAL at 09:07

## 2023-03-12 RX ADMIN — TRANEXAMIC ACID 1000 MG: 1 INJECTION, SOLUTION INTRAVENOUS at 10:03

## 2023-03-12 NOTE — PLAN OF CARE
Problem: BIRTH - VAGINAL/ SECTION  Goal: Fetal and maternal status remain reassuring during the birth process  Description: INTERVENTIONS:  - Monitor vital signs  - Monitor fetal heart rate  - Monitor uterine activity  - Monitor labor progression (vaginal delivery)  - DVT prophylaxis  - Antibiotic prophylaxis  Outcome: Progressing  Goal: Emotionally satisfying birthing experience for mother/fetus  Description: Interventions:  - Assess, plan, implement and evaluate the nursing care given to the patient in labor  - Advocate the philosophy that each childbirth experience is a unique experience and support the family's chosen level of involvement and control during the labor process   - Actively participate in both the patient's and family's teaching of the birth process  - Consider cultural, Caodaism and age-specific factors and plan care for the patient in labor  Outcome: Progressing     Problem: PAIN - ADULT  Goal: Verbalizes/displays adequate comfort level or baseline comfort level  Description: Interventions:  - Encourage patient to monitor pain and request assistance  - Assess pain using appropriate pain scale  - Administer analgesics based on type and severity of pain and evaluate response  - Implement non-pharmacological measures as appropriate and evaluate response  - Consider cultural and social influences on pain and pain management  - Notify physician/advanced practitioner if interventions unsuccessful or patient reports new pain  Outcome: Progressing     Problem: INFECTION - ADULT  Goal: Absence or prevention of progression during hospitalization  Description: INTERVENTIONS:  - Assess and monitor for signs and symptoms of infection  - Monitor lab/diagnostic results  - Monitor all insertion sites, i e  indwelling lines, tubes, and drains  - [place] appropriate cooling/warming therapies per order  - Administer medications as ordered  - Instruct and encourage patient and family to use good hand hygiene technique  - Identify and instruct in appropriate isolation precautions for identified infection/condition  Outcome: Progressing     Problem: SAFETY ADULT  Goal: Patient will remain free of falls  Description: INTERVENTIONS:  - Educate patient/family on patient safety including physical limitations  - Instruct patient to call for assistance with activity   - Consult OT/PT to assist with strengthening/mobility   - Keep Call bell within reach  - Keep bed low and locked with side rails adjusted as appropriate  - Keep care items and personal belongings within reach  - Initiate and maintain comfort rounds  - Make Fall Risk Sign visible to staff  - Apply yellow socks and bracelet for high fall risk patients  - Consider moving patient to room near nurses station  Outcome: Progressing  Goal: Maintain or return to baseline ADL function  Description: INTERVENTIONS:  -  Assess patient's ability to carry out ADLs; assess patient's baseline for ADL function and identify physical deficits which impact ability to perform ADLs (bathing, care of mouth/teeth, toileting, grooming, dressing, etc )  - Assess/evaluate cause of self-care deficits   - Assess range of motion  - Assess patient's mobility; develop plan if impaired  - Assess patient's need for assistive devices and provide as appropriate  - Encourage maximum independence but intervene and supervise when necessary  - Involve family in performance of ADLs  - Assess for home care needs following discharge   - Consider OT consult to assist with ADL evaluation and planning for discharge  - Provide patient education as appropriate  Outcome: Progressing  Goal: Maintains/Returns to pre admission functional level  Description: INTERVENTIONS:  - Perform BMAT or MOVE assessment daily    - Set and communicate daily mobility goal to care team and patient/family/caregiver     - Collaborate with rehabilitation services on mobility goals if consulted  - Out of bed for toileting  - Record patient progress and toleration of activity level   Outcome: Progressing     Problem: Knowledge Deficit  Goal: Patient/family/caregiver demonstrates understanding of disease process, treatment plan, medications, and discharge instructions  Description: Complete learning assessment and assess knowledge base  Interventions:  - Provide teaching at level of understanding  - Provide teaching via preferred learning methods  Outcome: Progressing  Goal: Verbalizes understanding of labor plan  Description: Assess patient/family/caregiver's baseline knowledge level and ability to understand information  Provide education via patient/family/caregiver's preferred learning method at appropriate level of understanding  1  Provide teaching at level of understanding  2  Provide teaching via preferred learning method(s)  Outcome: Progressing     Problem: DISCHARGE PLANNING  Goal: Discharge to home or other facility with appropriate resources  Description: INTERVENTIONS:  - Identify barriers to discharge w/patient and caregiver  - Arrange for needed discharge resources and transportation as appropriate  - Identify discharge learning needs (meds, wound care, etc )  - Arrange for interpretive services to assist at discharge as needed  - Refer to Case Management Department for coordinating discharge planning if the patient needs post-hospital services based on physician/advanced practitioner order or complex needs related to functional status, cognitive ability, or social support system  Outcome: Progressing     Problem: Labor & Delivery  Goal: Manages discomfort  Description: Assess and monitor for signs and symptoms of discomfort  Assess patient's pain level regularly and per hospital policy  Administer medications as ordered  Support use of nonpharmacological methods to help control pain such as distraction, imagery, relaxation, and application of heat and cold    Collaborate with interdisciplinary team and patient to determine appropriate pain management plan  1  Include patient in decisions related to comfort  2  Offer non-pharmacological pain management interventions  3  Report ineffective pain management to physician  Outcome: Progressing  Goal: Patient vital signs are stable  Description: 1  Assess vital signs - vaginal delivery    Outcome: Progressing

## 2023-03-12 NOTE — PLAN OF CARE
Problem: BIRTH - VAGINAL/ SECTION  Goal: Fetal and maternal status remain reassuring during the birth process  Description: INTERVENTIONS:  - Monitor vital signs  - Monitor fetal heart rate  - Monitor uterine activity  - Monitor labor progression (vaginal delivery)  - DVT prophylaxis  - Antibiotic prophylaxis  Outcome: Completed  Goal: Emotionally satisfying birthing experience for mother/fetus  Description: Interventions:  - Assess, plan, implement and evaluate the nursing care given to the patient in labor  - Advocate the philosophy that each childbirth experience is a unique experience and support the family's chosen level of involvement and control during the labor process   - Actively participate in both the patient's and family's teaching of the birth process  - Consider cultural, Adventist and age-specific factors and plan care for the patient in labor  Outcome: Completed     Problem: Knowledge Deficit  Goal: Patient/family/caregiver demonstrates understanding of disease process, treatment plan, medications, and discharge instructions  Description: Complete learning assessment and assess knowledge base  Interventions:  - Provide teaching at level of understanding  - Provide teaching via preferred learning methods  Outcome: Completed  Goal: Verbalizes understanding of labor plan  Description: Assess patient/family/caregiver's baseline knowledge level and ability to understand information  Provide education via patient/family/caregiver's preferred learning method at appropriate level of understanding  1  Provide teaching at level of understanding  2  Provide teaching via preferred learning method(s)  Outcome: Completed     Problem: Labor & Delivery  Goal: Manages discomfort  Description: Assess and monitor for signs and symptoms of discomfort  Assess patient's pain level regularly and per hospital policy  Administer medications as ordered   Support use of nonpharmacological methods to help control pain such as distraction, imagery, relaxation, and application of heat and cold  Collaborate with interdisciplinary team and patient to determine appropriate pain management plan  1  Include patient in decisions related to comfort  2  Offer non-pharmacological pain management interventions  3  Report ineffective pain management to physician  Outcome: Completed  Goal: Patient vital signs are stable  Description: 1  Assess vital signs - vaginal delivery    Outcome: Completed

## 2023-03-12 NOTE — ANESTHESIA PROCEDURE NOTES
Epidural Block    Patient location during procedure: OB  Reason for block: procedure for pain and at surgeon's request  Staffing  Performed: Anesthesiologist   Anesthesiologist: Pk Xavier MD  Preanesthetic Checklist  Completed: patient identified, IV checked, site marked, risks and benefits discussed, surgical consent, monitors and equipment checked, pre-op evaluation and timeout performed  Epidural  Patient position: sitting  Prep: ChloraPrep  Patient monitoring: cardiac monitor, frequent blood pressure checks, continuous pulse ox and heart rate  Approach: midline  Location: lumbar  Injection technique: JOSE MANUEL air  Needle  Needle type: Tuohy   Needle gauge: 18 G  Catheter type: side hole  Catheter size: 20 G  Catheter at skin depth: 13 cm  Catheter securement method: stabilization device  Test dose: negative  Assessment  Number of attempts: 1negative aspiration for CSF, negative aspiration for heme and no paresthesia on injection  patient tolerated the procedure well with no immediate complications

## 2023-03-12 NOTE — OB LABOR/OXYTOCIN SAFETY PROGRESS
Oxytocin Safety Progress Check Note - Worthy Alem 24 y o  female MRN: [id number]    Unit/Bed#: -01 Encounter: [id number]    Dose (dora-units/min) Oxytocin: 12 dora-units/min  Contraction Frequency (minutes): 3-4 5  Contraction Quality: Moderate  Tachysystole: No   Cervical Dilation: 5-6        Cervical Effacement: 60  Fetal Station: -2  Baseline Rate: 120 bpm  Fetal Heart Rate: 122 BPM  FHR Category: Category I               Vital Signs:   Vitals:    03/12/23 0700   BP: 101/65   Pulse: 78   Resp:    Temp:    SpO2:        Notes/comments: Patient has been on Pitocin for about 4 hours, additional amniotic membrane palpated on exam so able to artificially rupture for additional copious clear fluid, fetal head well applied to cervix, fetal heart tracing has been category 1, will continue to increase Pitocin and recheck in about 2 hours        Delio Westbrook MD 3/12/2023 7:20 AM

## 2023-03-12 NOTE — OB LABOR/OXYTOCIN SAFETY PROGRESS
-- see me in 3- 4 months  -- keep appt with Dr. Doe.      Oxytocin Safety Progress Check Note - Rachael Tsang 24 y o  female MRN: [id number]    Unit/Bed#: -01 Encounter: [id number]    Dose (dora-units/min) Oxytocin: 8 dora-units/min  Contraction Frequency (minutes): 1-4  Contraction Quality: Mild  Tachysystole: No   Cervical Dilation: 5        Cervical Effacement: 50  Fetal Station: -2  Baseline Rate: 115 bpm  Fetal Heart Rate: 118 BPM  FHR Category: Category I           Vital Signs:   Vitals:    03/12/23 0430   BP: 98/63   Pulse: 71   Resp: 16   Temp: 97 8 °F (36 6 °C)   SpO2:        Notes/comments:   Pt resting  SVE deferred  FHT cat 1 flora q4-5 mins  Will continue titrating pitocin at this time      Dr Charanjit Arrington aware    Davon Wheeler MD 3/12/2023 4:50 AM

## 2023-03-12 NOTE — OB LABOR/OXYTOCIN SAFETY PROGRESS
Oxytocin Safety Progress Check Note - Erik Vazquez 24 y o  female MRN: [id number]    Unit/Bed#: -01 Encounter: [id number]    Dose (dora-units/min) Oxytocin: 12 dora-units/min  Contraction Frequency (minutes): 2-5  Contraction Quality: Moderate  Tachysystole: No   Cervical Dilation: 5-6        Cervical Effacement: 60  Fetal Station: -2  Baseline Rate: 115 bpm  Fetal Heart Rate: 122 BPM  FHR Category: Category I               Vital Signs:   Vitals:    03/12/23 0800   BP: 138/74   Pulse:    Resp:    Temp:    SpO2:        Notes/comments:   Requesting an epidural  FHT is reassuring, early 272 Thom Morris MD 3/12/2023 8:11 AM

## 2023-03-12 NOTE — DISCHARGE SUMMARY
Discharge Summary - Sadie Arredondo 24 y o  female MRN: [id number]    Unit/Bed#: -01 Encounter: [id number]    Admission Date: 3/11/2023     Discharge Date: 3/13/2023    Admitting Diagnosis:   Patient Active Problem List   Diagnosis   • Supervision of other normal pregnancy, antepartum   • Family history of autosomal translocation   • Not immune to hepatitis B virus   • History of recurrent , antepartum   • History of macrosomia in infant in prior pregnancy, currently pregnant   • 39 weeks gestation of pregnancy       Discharge Diagnosis:   Same, delivered    Procedures:   spontaneous vaginal delivery    Admitting Attending: Dr Lucia Elena  Delivery Attending: Dr Mark Caldera MD  Discharge Attending: Dr Jasmin Holland: Sadie Arredondo is a 24 y o  [id number] who was admitted at 39w4d on the evening of 3/11/2023 for PROM at 17:00  Pt  Was augmented with pitocin for PROM  She received an epidural for pain management and was completely dilated at 09:45  She delivered a viable male  on 3/12/23 at [phone]  Weight 8lbs 8 9oz via spontaneous vaginal delivery  Apgars were 9 (1 min) and 9 (5 min)   was transferred to  nursery  Patient tolerated the procedure well and was transferred to recovery in stable condition  Her post-partum course was uncomplicated  Her post-partum pain was well controlled with oral analgesics  The patient's post partum course was unremarkable  On day of discharge, she was ambulating and able to reasonably perform all ADLs  She was voiding and had appropriate bowel function  Pain was well controlled  She was discharged home on postpartum day #1 without complications  Patient was instructed to follow up with her OB as an outpatient and was given appropriate warnings to call doctor or provider if she develops signs of infection or uncontrolled pain      On day of discharge she was ambulating, voiding spontaneously, tolerating oral intake and hemodynamically stable  Mom's blood type is AB positive and  Rhogam was not given  Disposition: Home    Planned Readmission: No    Discharge Medications:   Prenatal vitamin daily for 6 months or the duration of nursing, whichever is longer  Motrin 600 mg orally every 6 hours as needed for pain  Tylenol (over the counter) per bottle directions as needed for pain  Hydrocortisone cream 1% (over the counter) applied 1-2x daily to perineum as needed  Witch hazel pads for perineal discomfort as needed    Florentino Marin MD      Discharge instructions :   -Do not place anything (no partner, tampons or douche) in your vagina for 6 weeks  -You may walk for exercise for the first 6 weeks then gradually return to your usual activities    -Please do not drive for 1 week if you have no stitches and for 2 weeks if you have stitches or underwent a  delivery     -You may take baths or shower per your preference    -Please look at your bust (breasts) in the mirror daily and call your doctor for redness or tenderness or increased warmth  - If you have had a  section please look at your incision daily as well and call provider for increasing redness or steady drainage from the incision    -Please call your doctor's office if temperature > 100 4*F or 38* C, worsening pain or a foul discharge      Follow Up:  - Follow up in 3 weeks for postpartum visit    Florentino Marin MD

## 2023-03-12 NOTE — OB LABOR/OXYTOCIN SAFETY PROGRESS
Labor Progress Note - Aroldo Nieves 24 y o  female MRN: [id number]    Unit/Bed#: -01 Encounter: [id number]       Contraction Frequency (minutes): 1 5-10  Contraction Quality: Mild  Tachysystole: No   Cervical Dilation: 5        Cervical Effacement: 50  Fetal Station: -2  Baseline Rate: 115 bpm  Fetal Heart Rate: 136 BPM  FHR Category: Category I           Vital Signs:   Vitals:    03/11/23 2340   BP: 113/72   Pulse: 80   Resp: 16   Temp: 98 °F (36 7 °C)   SpO2:        Notes/comments:   Pt is not feeling any contractions  SVE as above and unchanged from prior  FHT cat 1 flora occasionally  Patient asking for alternatives to pitocin as she wants to go into labor naturally  Discussed with her that given that she has been ruptured since 5 pm and has not started having contractions yet, it is not likely that she will start having spontaneous contractions  Explained to her the gradual increase of pitocin dosage and requirement for continuous monitoring and BP measurement q15 mins  She understands and is agreeable to starting pitocin to prevent increased infection risk s/p ROM without labor progress  Will start pitocin at this time        Dr Killian Dempsey aware  Eldon Dugan MD 3/12/2023 12:47 AM

## 2023-03-12 NOTE — H&P
H&P Exam - Obstetrics   Philomena Smith 24 y o  female MRN: [id number]  Unit/Bed#: -01 Encounter: [id number]      ASSESSMENT:  25 yo  at 39w3d weeks gestation who is being admitted for PROM   EFW:  2040 grams - 4 lbs 8 oz                 (66%)  VTX by transabdominal exam    PLAN:  * 39 weeks gestation of pregnancy  Assessment & Plan  Admit  Follow up CBC, RPR, Blood Type  Start with expectant management  Method of contraception: condoms  GBS status: neg   Analgesia and/or epidural at patient request  Anticipate     Not immune to hepatitis B virus  Assessment & Plan  Vaccine after delivery    Discussed with Dr Martin Fairly    This patient will be an INPATIENT  and I certify the anticipated length of stay is >2 Midnights  History of Present Illness     Chief Complaint: PROM    HPI:  Philomena Smith is a 24 y o  [id number] female with an LYNDA of 3/15/2023, by Last Menstrual Period at 39w3d weeks gestation who is being admitted for PROM  She reports she had a large gush of fluid at 5 pm and continued having leakage of fluid  She has had occasional contractions but nothing consistent  She does not want pitocin and would like to "wait to go into natural labor "    Contractions: no  Loss of fluid: yes  Vaginal bleeding: no  Fetal movement: yes    She is a [facility] patient  PREGNANCY COMPLICATIONS:   1) Hep B NI  2) Hx of macrosomia in previous pregnancy    OB History    Para Term  AB Living   4 1 1 0 2 1   SAB IAB Ectopic Multiple Live Births   2 0 0   1      # Outcome Date GA Lbr Red/2nd Weight Sex Delivery Anes PTL Lv   4 Current            3 Term 21 40w4d / 00:29 4245 g (9 lb 5 7 oz) F Vag-Spont Local  PRADIP   2 SAB 2019 5w0d    SAB      1 2019 6w0d             Birth Comments: MIssed ab Medical ab       Baby complications/comments: none    Review of Systems   Constitutional: Negative for chills and fever  Eyes: Negative for visual disturbance     Respiratory: Negative for chest tightness and shortness of breath  Cardiovascular: Negative for chest pain and palpitations  Gastrointestinal: Negative for abdominal pain  Genitourinary: Positive for vaginal discharge  Negative for vaginal bleeding and vaginal pain  Musculoskeletal: Negative for back pain  Neurological: Negative for headaches  Historical Information   No past medical history on file  Past Surgical History:   Procedure Laterality Date   • HAND SURGERY      age 17 - trauma   • TONSILLECTOMY AND ADENOIDECTOMY     • WISDOM TOOTH EXTRACTION       Social History   Social History     Substance and Sexual Activity   Alcohol Use Never     Social History     Substance and Sexual Activity   Drug Use Never     Social History     Tobacco Use   Smoking Status Never   Smokeless Tobacco Never     Family History: non-contributory    Meds/Allergies      Medications Prior to Admission   Medication   • Prenatal Multivit-Min-Fe-FA (PRENATAL VITAMINS PO)      No Known Allergies    OBJECTIVE:    Vitals: Blood pressure 113/72, pulse 72, temperature 98 °F (36 7 °C), temperature source Oral, resp  rate 16, height 5' 6" (1 676 m), weight 93 9 kg (207 lb), last menstrual period 06/08/2022, SpO2 97 %, not currently breastfeeding  Body mass index is 33 41 kg/m²  Physical Exam  HENT:      Head: Normocephalic  Mouth/Throat:      Pharynx: Oropharynx is clear  Eyes:      Conjunctiva/sclera: Conjunctivae normal    Cardiovascular:      Rate and Rhythm: Normal rate  Pulses: Normal pulses  Pulmonary:      Effort: Pulmonary effort is normal    Abdominal:      Palpations: Abdomen is soft  Tenderness: There is no abdominal tenderness  Genitourinary:     General: Normal vulva  Comments: Grossly ruptured with fluid on perineum  Skin:     General: Skin is warm  Neurological:      Mental Status: She is alert and oriented to person, place, and time     Psychiatric:         Mood and Affect: Mood normal          Nitrazine: pos    Cervix:  5/50/-2    Fetal heart rate:   Baseline Rate: 115 bpm  Variability: Moderate 6-25 bpm  Accelerations: 15 x 15 or greater, At variable times  Decelerations: Early  FHR Category: Category I    [place]:   Contraction Frequency (minutes): 1 5-10  Contraction Duration (seconds): 60-90  Contraction Quality: Mild      Prenatal Labs:   Blood Type:   Lab Results   Component Value Date/Time    ABO Grouping AB 03/11/2023 09:15 PM     , D (Rh type):   Lab Results   Component Value Date/Time    Rh Factor Positive 03/11/2023 09:15 PM    Rh Type RH(D) POSITIVE 03/16/2019 08:18 AM     , Antibody Screen: negative  , HCT/HGB:   Lab Results   Component Value Date/Time    Hematocrit 37 6 03/11/2023 09:15 PM    Hemoglobin 12 9 03/11/2023 09:15 PM      , MCV:   Lab Results   Component Value Date/Time    MCV 90 03/11/2023 09:15 PM      , Platelets:   Lab Results   Component Value Date/Time    Platelets 398 26/42/6803 09:15 PM      , 1 hour Glucola:   Lab Results   Component Value Date/Time    Glucose 98 01/05/2023 08:20 AM   Varicella:   Lab Results   Component Value Date/Time    Varicella IgG IMMUNE 01/05/2023 08:20 AM       , Rubella: immune     , VDRL/RPR:   Lab Results   Component Value Date/Time    RPR Non-Reactive 01/05/2023 08:20 AM      , Hep B:   Lab Results   Component Value Date/Time    Hepatitis B Surface Ag negative 08/08/2022 12:00 AM     , Hep C: non-reactive   , HIV:   Lab Results   Component Value Date/Time    HIV AG/AB, 4th Gen NON-REACTIVE 03/16/2019 08:18 AM     , Chlamydia: negative  , Gonorrhea:   Lab Results   Component Value Date/Time    N gonorrhoeae, DNA Probe Negative 02/15/2023 02:16 PM     , Group B Strep:    Lab Results   Component Value Date/Time    Strep Grp B PCR Negative 02/15/2023 02:16 PM          Invasive Devices     Peripheral Intravenous Line  Duration           Peripheral IV 03/11/23 Left;Dorsal (posterior) Forearm <1 day                Kassidy Pedersen MD  PGY-2  3/12/2023  1:06 AM

## 2023-03-12 NOTE — ANESTHESIA PREPROCEDURE EVALUATION
Procedure:  LABOR ANALGESIA    Relevant Problems   GYN   (+) 39 weeks gestation of pregnancy   (+) Supervision of other normal pregnancy, antepartum      NEURO/PSYCH   (+) History of macrosomia in infant in prior pregnancy, currently pregnant   (+) History of recurrent , antepartum       Latest Reference Range & Units 23 21:15   WBC 4 31 - 10 16 Thousand/uL 10 15   Red Blood Cell Count 3 81 - 5 12 Million/uL 4 17   Hemoglobin 11 5 - 15 4 g/dL 12 9   HCT 34 8 - 46 1 % 37 6   MCV 82 - 98 fL 90   MCH 26 8 - 34 3 pg 30 9   MCHC 31 4 - 37 4 g/dL 34 3   RDW 11 6 - 15 1 % 13 5   Platelet Count 066 - 390 Thousands/uL 170   MPV 8 9 - 12 7 fL 11 2     Physical Exam    Airway    Mallampati score: II  TM Distance: >3 FB  Neck ROM: full     Dental   No notable dental hx     Cardiovascular      Pulmonary      Other Findings        Anesthesia Plan  ASA Score- 2     Anesthesia Type- epidural with ASA Monitors  Additional Monitors:   Airway Plan:           Plan Factors-Exercise tolerance (METS): >4 METS  Chart reviewed  Existing labs reviewed  Patient summary reviewed  Patient is not a current smoker  Patient did not smoke on day of surgery  Induction-     Postoperative Plan-     Informed Consent- Anesthetic plan and risks discussed with patient  I personally reviewed this patient with the CRNA  Discussed and agreed on the Anesthesia Plan with the CRNA  Berna Zaidi

## 2023-03-12 NOTE — ANESTHESIA POSTPROCEDURE EVALUATION
Post-Op Assessment Note    CV Status:  Stable  Pain Score: 0    Pain management: adequate     Mental Status:  Alert and awake   Hydration Status:  Euvolemic   PONV Controlled:  Controlled   Airway Patency:  Patent      Post Op Vitals Reviewed: Yes      Staff: CRNA   Comments: epidural catheter tip intact following removal    Post-op block assessment: no complications      No notable events documented      BP      Temp      Pulse     Resp      SpO2

## 2023-03-12 NOTE — L&D DELIVERY NOTE
Vaginal Delivery Summary - OB/GYN   Larissa Lange 24 y o  female MRN: [id number]  Unit/Bed#: -01 Encounter: [id number]          Predelivery Diagnosis:  1  Pregnancy at 39 4 wks  2  PROM    Postdelivery Diagnosis:  1  Same as above  2  Delivery of term     Procedure: normal spontaneous vaginal delivery    Attending: Theron    Resident: No qualified residents available     Anesthesia: epidural    QBL: TBD    Complications: none apparent    Specimens: cord blood, arterial and venous cord blood gasses, placenta (to storage), segment of cord    Findings:   1  Viable male at 09:47, with APGARS of 9 and 9 at 1 and 5 minutes respectively, Weight not available at time of dictation  2  spontaneous at 09:53  3  Intact perineum  4  Arterial Blood gas 7 373, BE -1 9  5  Venous Blood gas 7 286  BE -2 2    Disposition:  Patient tolerated the procedure well and was recovering in labor and delivery room     Brief history and labor course:  Ms Larissa Lange is a 24 y o   at 39 3 wks admitted on the evening of 3/11/2023 for PROM at 17:00  Pt  Was augmented with pitocin for PROM  She received an epidural for pain management and was completely dilated at 09:45  Description of procedure    After pushing for 1 minutes, at 09:47 patient delivered a viable male , apgars of 9 (1 min) and 9 (5 min)  The fetal vertex delivered spontaneously  There was no evidence for nuchal cord  The anterior should delivered atraumatically with maternal expulsive forces and the assistance of downward traction  The posterior shoulder delivered with maternal expulsive forces and the assistance of upward traction  The remainder of the fetus delivered spontaneously  Upon delivery, the infant was placed on the mothers abdomen and the cord was clamped and cut  Awaiting nurse resuscitators evaluated the   Arterial and venous cord blood gases and cord blood was collected for analysis   These were promptly sent to the lab  In the immediate post-partum, 30 units of IV pitocin was administered, and the uterus was noted to contract down well with massage and pitocin  Cytotec and Tranexamic acid were given as a precaution due to hx of PPH, rapid delivery and large baby  The placenta delivered spontaneously at 09:53 and was noted to have a centrally inserted 3 vessel cord  The uterus was massaged until firm and the lower uterine segment was cleared of all clot and debris  The vagina, cervix, perineum, and rectum were inspected and was notably intact  At the conclusion of the procedure, all needle, sponge, and instrument counts were noted to be correct  Patient tolerated the procedure well  Dr Elena Coffey was present and participated in all key portions of the case

## 2023-03-13 VITALS
DIASTOLIC BLOOD PRESSURE: 77 MMHG | RESPIRATION RATE: 18 BRPM | SYSTOLIC BLOOD PRESSURE: 107 MMHG | OXYGEN SATURATION: 97 % | WEIGHT: 207 LBS | BODY MASS INDEX: 33.27 KG/M2 | HEIGHT: 66 IN | HEART RATE: 63 BPM | TEMPERATURE: 97.5 F

## 2023-03-13 RX ADMIN — IBUPROFEN 600 MG: 600 TABLET, FILM COATED ORAL at 09:34

## 2023-03-13 RX ADMIN — IBUPROFEN 600 MG: 600 TABLET, FILM COATED ORAL at 01:49

## 2023-03-13 RX ADMIN — DOCUSATE SODIUM 100 MG: 100 CAPSULE, LIQUID FILLED ORAL at 09:32

## 2023-03-13 NOTE — PROGRESS NOTES
Progress Note - OB/GYN   Guido Garcia 24 y o  female MRN: [id number]  Unit/Bed#:  303-01 Encounter: [id number]    Assessment:  Post partum Day #1 s/pSVD, stable, baby in nursery    Plan:  1) Continue routine post partum care   Encourage ambulation   Encourage breastfeeding   Meeting postpartum milestones    Anticipate discharge today     Subjective/Objective   Chief Complaint:     Post delivery  Patient is doing well  Lochia WNL  Pain well controlled  Subjective:     Pain: yes, cramping, improved with meds  Tolerating PO: yes  Voiding: yes  Ambulating: yes  Chest pain: no  Shortness of breath: no  Leg pain: no  Lochia: minimal    Objective:     Vitals: /72 (BP Location: Right arm)   Pulse 59   Temp 97 5 °F (36 4 °C) (Oral)   Resp 16   Ht 5' 6" (1 676 m)   Wt 93 9 kg (207 lb)   LMP 06/08/2022 (Exact Date)   SpO2 97%   Breastfeeding Yes   BMI 33 41 kg/m²       Intake/Output Summary (Last 24 hours) at 3/13/2023 2602  Last data filed at 3/12/2023 1639  Gross per 24 hour   Intake --   Output 919 ml   Net -919 ml       Lab Results   Component Value Date    WBC 10 15 03/11/2023    HGB 12 9 03/11/2023    HCT 37 6 03/11/2023    MCV 90 03/11/2023     03/11/2023       Physical Exam:     Gen: AAOx3, NAD  CV: RRR  Lungs: CTA b/l  Abd: Soft, non-tender, non-distended, no rebound or guarding  Uterine fundus firm and non-tender, below the umbilicus     Ext: Non tender    Sharda Price MD  3/13/2023  6:33 AM

## 2023-03-13 NOTE — DISCHARGE INSTR - ACTIVITY
Provided education on alignment of nose to breast; bring baby to breast and not breast to baby; support head with opp  Hand in cross cradle; use pillows to lift baby to be belly to belly; ear, shoulder, hip alignment; Support mother's back and place self in comfortable position to support bringing baby to the breast  Shoulders should be down and away from ears  Education on positioning and alignment  Mom is encouraged to:     - Bring baby up to the breast (use of pillows to elevate so baby's torso is against mom's breasts)   - Skin to skin for feedings with top hand exposed to show signs of satiation   - Chin deep into breast tissue (make baby look up to the nipple)   - nose aligned to the nipple   -Wait for wide gape, drag chin on the breast so nipple is aimed at the upper, back palate  - Cheek should be touching breast   - Deep, firm hold of baby with ear, shoulder, hip alignment    To help your nipples heal, in addition to paying close attention to latch and positioning, apply protective ointment after feeding or pumping and cover with an occlusive dressing  Nurse on demand: when baby gives hunger cues; when your breasts feel full, or at least every 3 hours during the day and every 5 hours at night counting from the beginning of one feeding to the beginning of the next; which ever comes first  When sucking and swallowing slow, gently compress the breast to restart flow  If active suck-swallow does not restart, gently remove the baby and offer the other breast; offering up to "four" breasts per feeding

## 2023-03-13 NOTE — PLAN OF CARE
Problem: PAIN - ADULT  Goal: Verbalizes/displays adequate comfort level or baseline comfort level  Description: Interventions:  - Encourage patient to monitor pain and request assistance  - Assess pain using appropriate pain scale  - Administer analgesics based on type and severity of pain and evaluate response  - Implement non-pharmacological measures as appropriate and evaluate response  - Consider cultural and social influences on pain and pain management  - Notify physician/advanced practitioner if interventions unsuccessful or patient reports new pain  Outcome: Progressing     Problem: INFECTION - ADULT  Goal: Absence or prevention of progression during hospitalization  Description: INTERVENTIONS:  - Assess and monitor for signs and symptoms of infection  - Monitor lab/diagnostic results  - Monitor all insertion sites, i e  indwelling lines, tubes, and drains  - Monitor endotracheal if appropriate and nasal secretions for changes in amount and color  - [place] appropriate cooling/warming therapies per order  - Administer medications as ordered  - Instruct and encourage patient and family to use good hand hygiene technique  - Identify and instruct in appropriate isolation precautions for identified infection/condition  Outcome: Progressing     Problem: SAFETY ADULT  Goal: Patient will remain free of falls  Description: INTERVENTIONS:  - Educate patient/family on patient safety including physical limitations  - Instruct patient to call for assistance with activity   - Consult OT/PT to assist with strengthening/mobility   - Keep Call bell within reach  - Keep bed low and locked with side rails adjusted as appropriate  - Keep care items and personal belongings within reach  - Initiate and maintain comfort round  Outcome: Progressing  Goal: Maintain or return to baseline ADL function  Description: INTERVENTIONS:  -  Assess patient's ability to carry out ADLs; assess patient's baseline for ADL function and identify physical deficits which impact ability to perform ADLs (bathing, care of mouth/teeth, toileting, grooming, dressing, etc )  - Assess/evaluate cause of self-care deficits   - Assess range of motion  - Assess patient's mobility; develop plan if impaired  - Assess patient's need for assistive devices and provide as appropriate  - Encourage maximum independence but intervene and supervise when necessary  - Involve family in performance of ADLs  - Assess for home care needs following discharge   - Consider OT consult to assist with ADL evaluation and planning for discharge  - Provide patient education as appropriate  Outcome: Progressing  Goal: Maintains/Returns to pre admission functional level  Description: INTERVENTIONS:  - Perform BMAT or MOVE assessment daily    - Set and communicate daily mobility goal to care team and patient/family/caregiver     - Collaborate with rehabilitation services on mobility goals if consulted  Outcome: Progressing     Problem: DISCHARGE PLANNING  Goal: Discharge to home or other facility with appropriate resources  Description: INTERVENTIONS:  - Identify barriers to discharge w/patient and caregiver  - Arrange for needed discharge resources and transportation as appropriate  - Identify discharge learning needs (meds, wound care, etc )  - Arrange for interpretive services to assist at discharge as needed  - Refer to Case Management Department for coordinating discharge planning if the patient needs post-hospital services based on physician/advanced practitioner order or complex needs related to functional status, cognitive ability, or social support system  Outcome: Progressing     Problem: POSTPARTUM  Goal: Experiences normal postpartum course  Description: INTERVENTIONS:  - Monitor maternal vital signs  - Assess uterine involution and lochia  Outcome: Progressing  Goal: Appropriate maternal -  bonding  Description: INTERVENTIONS:  - Identify family support  - Assess for appropriate maternal/infant bonding   -Encourage maternal/infant bonding opportunities  - Referral to  or  as needed  Outcome: Progressing  Goal: Establishment of infant feeding pattern  Description: INTERVENTIONS:  - Assess breast/bottle feeding  - Refer to lactation as needed  Outcome: Progressing

## 2023-03-13 NOTE — UTILIZATION REVIEW
NOTIFICATION OF INPATIENT ADMISSION   MATERNITY/DELIVERY AUTHORIZATION REQUEST   SERVICING FACILITY:   [facility], [facility]–1983 - L&D, [place], NICU  [address] [facility], [address] [place] [address]  Tax ID: [id number]  NPI: [id number]   ATTENDING PROVIDER:  Attending Name and NPI#: Britt Wu Md [[id number]]  Address: [address] [place] [address], [address] [place] [address]  Phone: [phone]   ADMISSION INFORMATION:  Place of Service: Inpatient [address] [facility]  [address]  Place of Service Code: 21  Inpatient Admission Date/Time: 3/11/23  8:34 PM  Discharge Date/Time: No discharge date for patient encounter  Admitting Diagnosis Code/Description:  39 weeks gestation of pregnancy [Z3A 39]  Full-term premature rupture of membranes, unspecified as to length of time between rupture and onset of labor [O42 92]  Encounter for full-term uncomplicated delivery [K61]     Mother: Blanca Duvall 1995 Estimated Date of Delivery: 3/15/23  Delivering clinician: Dhaval Crump    OB History        4    Para   2    Term   2       0    AB   2    Living   2       SAB   2    IAB   0    Ectopic   0    Multiple   0    Live Births   2                Name & MRN:   Information for the patient's :  Reza Robert [[id number]]      Delivery Information:  Sex: male  Delivered 3/12/2023 9:47 AM by Vaginal, Spontaneous; Gestational Age: 43w3d    [place] Measurements:  Weight: 8 lb 8 9 oz (3880 g); Height: 21"    APGAR 1 minute 5 minutes 10 minutes   Totals: 9 9       Birth Information: 24 y o  female MRN: [id number] Unit/Bed#: -01   Birthweight: No birth weight on file  Gestational Age: <None> Delivery Type:    APGARS Totals:        UTILIZATION REVIEW CONTACT:  Anthony Quintero Utilization   Network Utilization Review Department  Phone: [phone]  Fax [phone]  Email: [email or web address]  Contact for approvals/pending authorizations, clinical reviews, and discharge  PHYSICIAN ADVISORY SERVICES:  Medical Necessity Denial & [place] Review  Phone: [phone]  Fax: [phone]  Email: [email or web address]

## 2023-03-13 NOTE — PLAN OF CARE
Problem: PAIN - ADULT  Goal: Verbalizes/displays adequate comfort level or baseline comfort level  Description: Interventions:  - Encourage patient to monitor pain and request assistance  - Assess pain using appropriate pain scale  - Administer analgesics based on type and severity of pain and evaluate response  - Implement non-pharmacological measures as appropriate and evaluate response  - Consider cultural and social influences on pain and pain management  - Notify physician/advanced practitioner if interventions unsuccessful or patient reports new pain  3/13/2023 1020 by Fox Bojorquez RN  Outcome: Adequate for Discharge  3/13/2023 1020 by Fox Bojorquez RN  Outcome: Adequate for Discharge     Problem: INFECTION - ADULT  Goal: Absence or prevention of progression during hospitalization  Description: INTERVENTIONS:  - Assess and monitor for signs and symptoms of infection  - Monitor lab/diagnostic results  - Monitor all insertion sites, i e  indwelling lines, tubes, and drains  - Monitor endotracheal if appropriate and nasal secretions for changes in amount and color  - [place] appropriate cooling/warming therapies per order  - Administer medications as ordered  - Instruct and encourage patient and family to use good hand hygiene technique  - Identify and instruct in appropriate isolation precautions for identified infection/condition  3/13/2023 1020 by Fox Bojorquez RN  Outcome: Adequate for Discharge  3/13/2023 1020 by Fox Bojorquez RN  Outcome: Adequate for Discharge     Problem: SAFETY ADULT  Goal: Patient will remain free of falls  Description: INTERVENTIONS:  - Educate patient/family on patient safety including physical limitations  - Instruct patient to call for assistance with activity   - Consult OT/PT to assist with strengthening/mobility   - Keep Call bell within reach  - Keep bed low and locked with side rails adjusted as appropriate  - Keep care items and personal belongings within reach  - Initiate and maintain comfort round  3/13/2023 1020 by Yoana Mendez RN  Outcome: Adequate for Discharge  3/13/2023 1020 by Yoana Mendez RN  Outcome: Adequate for Discharge  Goal: Maintain or return to baseline ADL function  Description: INTERVENTIONS:  -  Assess patient's ability to carry out ADLs; assess patient's baseline for ADL function and identify physical deficits which impact ability to perform ADLs (bathing, care of mouth/teeth, toileting, grooming, dressing, etc )  - Assess/evaluate cause of self-care deficits   - Assess range of motion  - Assess patient's mobility; develop plan if impaired  - Assess patient's need for assistive devices and provide as appropriate  - Encourage maximum independence but intervene and supervise when necessary  - Involve family in performance of ADLs  - Assess for home care needs following discharge   - Consider OT consult to assist with ADL evaluation and planning for discharge  - Provide patient education as appropriate  3/13/2023 1020 by Yoana Mendez RN  Outcome: Adequate for Discharge  3/13/2023 1020 by Yoana Mendez RN  Outcome: Adequate for Discharge  Goal: Maintains/Returns to pre admission functional level  Description: INTERVENTIONS:  - Perform BMAT or MOVE assessment daily    - Set and communicate daily mobility goal to care team and patient/family/caregiver     - Collaborate with rehabilitation services on mobility goals if consulted  3/13/2023 1020 by Yoana Mendez RN  Outcome: Adequate for Discharge  3/13/2023 1020 by Yoana Mendez RN  Outcome: Adequate for Discharge     Problem: DISCHARGE PLANNING  Goal: Discharge to home or other facility with appropriate resources  Description: INTERVENTIONS:  - Identify barriers to discharge w/patient and caregiver  - Arrange for needed discharge resources and transportation as appropriate  - Identify discharge learning needs (meds, wound care, etc )  - Arrange for interpretive services to assist at discharge as needed  - Refer to Case Management Department for coordinating discharge planning if the patient needs post-hospital services based on physician/advanced practitioner order or complex needs related to functional status, cognitive ability, or social support system  3/13/2023 1020 by Rico Alas RN  Outcome: Adequate for Discharge  3/13/2023 1020 by Rico Alas RN  Outcome: Adequate for Discharge     Problem: POSTPARTUM  Goal: Experiences normal postpartum course  Description: INTERVENTIONS:  - Monitor maternal vital signs  - Assess uterine involution and lochia  3/13/2023 1020 by Rico Alas RN  Outcome: Adequate for Discharge  3/13/2023 1020 by Rico Alas RN  Outcome: Adequate for Discharge  Goal: Appropriate maternal -  bonding  Description: INTERVENTIONS:  - Identify family support  - Assess for appropriate maternal/infant bonding   -Encourage maternal/infant bonding opportunities  - Referral to  or  as needed  3/13/2023 1020 by Rico Alas RN  Outcome: Adequate for Discharge  3/13/2023 1020 by Rico Alas RN  Outcome: Adequate for Discharge  Goal: Establishment of infant feeding pattern  Description: INTERVENTIONS:  - Assess breast/bottle feeding  - Refer to lactation as needed  3/13/2023 1020 by Rico Alas RN  Outcome: Adequate for Discharge  3/13/2023 1020 by Rico Alas RN  Outcome: Adequate for Discharge

## 2023-03-13 NOTE — PROGRESS NOTES
Progress Note - OB/GYN  Radha Schumacher 41 y o  female MRN: [id number]  Unit/Bed#:  303-01 Encounter: [id number]    Assessment and Plan     Radha Alaniz is a patient of: [facility]  She is PPD# 1 s/p  spontaneous vaginal delivery  Recovering well and is stable       Not immune to hepatitis B virus  Assessment & Plan  Vaccine after delivery    *  (spontaneous vaginal delivery)  Assessment & Plan  Recovering well   Encourage Ambulation  Encourage breastfeeding  GBS -   Rh +        Disposition    - Anticipate discharge home on PPD# 1      Subjective/Objective     Chief Complaint: Postpartum State     Subjective: Radha Alaniz is PPD#1 s/p  spontaneous vaginal delivery  She has no current complaints  Pain is well controlled  Patient is currently voiding  She is ambulating  Patient is currently passing flatus and has had bowel movement  She is tolerating PO, and denies nausea or vomitting  Patient denies fever, chills, chest pain, shortness of breath, or calf tenderness  Lochia is normal  She is  Breastfeeding  She is recovering well and is stable  She desires discharge today if she and baby are both cleared         Vitals:   /72 (BP Location: Right arm)   Pulse 59   Temp 97 5 °F (36 4 °C) (Oral)   Resp 16   Ht 5' 6" (1 676 m)   Wt 93 9 kg (207 lb)   LMP 2022 (Exact Date)   SpO2 97%   Breastfeeding Yes   BMI 33 41 kg/m²       Intake/Output Summary (Last 24 hours) at 3/13/2023 3496  Last data filed at 3/12/2023 1639  Gross per 24 hour   Intake --   Output 919 ml   Net -919 ml       Invasive Devices     Peripheral Intravenous Line  Duration           Peripheral IV 23 Left;Dorsal (posterior) Forearm 1 day                Physical Exam:   GEN: [place] Scale appears well, alert and oriented x 3, pleasant and cooperative   CARDIO: RRR, no murmurs or rubs  RESP:  CTAB, no wheezes or rales  ABDOMEN: soft, no tenderness, no distention, fundus @ U-3  EXTREMITIES: non tender, no erythema  Labs:     Hemoglobin   Date Value Ref Range Status   03/11/2023 12 9 11 5 - 15 4 g/dL Final   01/05/2023 12 7 11 5 - 15 4 g/dL Final     WBC   Date Value Ref Range Status   03/11/2023 10 15 4 31 - 10 16 Thousand/uL Final   01/05/2023 9 89 4 31 - 10 16 Thousand/uL Final     Platelets   Date Value Ref Range Status   03/11/2023 170 149 - 390 Thousands/uL Final   01/05/2023 177 149 - 390 Thousands/uL Final          Garrick España MD  3/13/2023  6:09 AM

## 2023-03-13 NOTE — PLAN OF CARE
Problem: PAIN - ADULT  Goal: Verbalizes/displays adequate comfort level or baseline comfort level  Description: Interventions:  - Encourage patient to monitor pain and request assistance  - Assess pain using appropriate pain scale  - Administer analgesics based on type and severity of pain and evaluate response  - Implement non-pharmacological measures as appropriate and evaluate response  - Consider cultural and social influences on pain and pain management  - Notify physician/advanced practitioner if interventions unsuccessful or patient reports new pain  Outcome: Adequate for Discharge     Problem: INFECTION - ADULT  Goal: Absence or prevention of progression during hospitalization  Description: INTERVENTIONS:  - Assess and monitor for signs and symptoms of infection  - Monitor lab/diagnostic results  - Monitor all insertion sites, i e  indwelling lines, tubes, and drains  - Monitor endotracheal if appropriate and nasal secretions for changes in amount and color  - [place] appropriate cooling/warming therapies per order  - Administer medications as ordered  - Instruct and encourage patient and family to use good hand hygiene technique  - Identify and instruct in appropriate isolation precautions for identified infection/condition  Outcome: Adequate for Discharge     Problem: SAFETY ADULT  Goal: Patient will remain free of falls  Description: INTERVENTIONS:  - Educate patient/family on patient safety including physical limitations  - Instruct patient to call for assistance with activity   - Consult OT/PT to assist with strengthening/mobility   - Keep Call bell within reach  - Keep bed low and locked with side rails adjusted as appropriate  - Keep care items and personal belongings within reach  - Initiate and maintain comfort round  Outcome: Adequate for Discharge  Goal: Maintain or return to baseline ADL function  Description: INTERVENTIONS:  -  Assess patient's ability to carry out ADLs; assess patient's baseline for ADL function and identify physical deficits which impact ability to perform ADLs (bathing, care of mouth/teeth, toileting, grooming, dressing, etc )  - Assess/evaluate cause of self-care deficits   - Assess range of motion  - Assess patient's mobility; develop plan if impaired  - Assess patient's need for assistive devices and provide as appropriate  - Encourage maximum independence but intervene and supervise when necessary  - Involve family in performance of ADLs  - Assess for home care needs following discharge   - Consider OT consult to assist with ADL evaluation and planning for discharge  - Provide patient education as appropriate  Outcome: Adequate for Discharge  Goal: Maintains/Returns to pre admission functional level  Description: INTERVENTIONS:  - Perform BMAT or MOVE assessment daily    - Set and communicate daily mobility goal to care team and patient/family/caregiver     - Collaborate with rehabilitation services on mobility goals if consulted  Outcome: Adequate for Discharge     Problem: DISCHARGE PLANNING  Goal: Discharge to home or other facility with appropriate resources  Description: INTERVENTIONS:  - Identify barriers to discharge w/patient and caregiver  - Arrange for needed discharge resources and transportation as appropriate  - Identify discharge learning needs (meds, wound care, etc )  - Arrange for interpretive services to assist at discharge as needed  - Refer to Case Management Department for coordinating discharge planning if the patient needs post-hospital services based on physician/advanced practitioner order or complex needs related to functional status, cognitive ability, or social support system  Outcome: Adequate for Discharge     Problem: POSTPARTUM  Goal: Experiences normal postpartum course  Description: INTERVENTIONS:  - Monitor maternal vital signs  - Assess uterine involution and lochia  Outcome: Adequate for Discharge  Goal: Appropriate maternal -  bonding  Description: INTERVENTIONS:  - Identify family support  - Assess for appropriate maternal/infant bonding   -Encourage maternal/infant bonding opportunities  - Referral to  or  as needed  Outcome: Adequate for Discharge  Goal: Establishment of infant feeding pattern  Description: INTERVENTIONS:  - Assess breast/bottle feeding  - Refer to lactation as needed  Outcome: Adequate for Discharge

## 2023-03-13 NOTE — LACTATION NOTE
This note was copied from a baby's chart  CONSULT - LACTATION  Baby Boy Conner Kristen) Trish 1 days male MRN: [id number]    [facility] NURSERY Room / Bed: (N)/(N) Encounter: [id number]    Maternal Information     MOTHER:  Niki Chambers  Maternal Age: 24 y o    OB History: # 1 - Date: 2019, Sex: None, Weight: None, GA: 6w0d, Delivery: None, Apgar1: None, Apgar5: None, Living: None, Birth Comments: MIssed ab Medical ab    # 2 - Date: 2019, Sex: None, Weight: None, GA: 5w0d, Delivery: Spontaneous , Apgar1: None, Apgar5: None, Living: None, Birth Comments: None    # 3 - Date: 21, Sex: Female, Weight: 4245 g (9 lb 5 7 oz), GA: 40w4d, Delivery: Vaginal, Spontaneous, Apgar1: 8, Apgar5: 9, Living: Living, Birth Comments: None    # 4 - Date: 23, Sex: Male, Weight: 3880 g (8 lb 8 9 oz), GA: 39w4d, Delivery: Vaginal, Spontaneous, Apgar1: 9, Apgar5: 9, Living: Living, Birth Comments: None   Previouse breast reduction surgery? No    Lactation history:   Has patient previously breast fed:      How long had patient previously breast fed:     Previous breast feeding complications:       Past Surgical History:   Procedure Laterality Date   • HAND SURGERY      age 17 - trauma   • TONSILLECTOMY AND ADENOIDECTOMY     • WISDOM TOOTH EXTRACTION          Birth information:  YOB: 2021   Time of birth: 9:47 AM   Sex: male   Delivery type: Vaginal, Spontaneous   Birth Weight: 3880 g (8 lb 8 9 oz)   Percent of Weight Change: -3%     Gestational Age: 43w3d   [unfilled]    Assessment     Breast and nipple assessment: large, pendulous breasts with light colored areolas and short, everted nipples     Assessment: normal assessment     Feeding assessment: latch difficulty (due to positioning)  LATCH:  Latch: Grasps breast, tongue down, lips flanged, rhythmic sucking   Audible Swallowing: Spontaneous and intermittent (24 hours old)   Type of Nipple: Everted (After stimulation)   Comfort (Breast/Nipple): Soft/non-tender   Hold (Positioning): No assist from staff, mother able to position/hold infant   LATCH Score: 6          Feeding recommendations:  breast feed on demand  Mom states baby is not latching well since birth  Mom just finished breastfeeding her first child 6 months ago  Mom has some bruising bilaterally on the nipple face  Mom has baby on the left breast in a football hold  Baby can't latch onto the breast due to coming from under the breast and chin touching the chest      Education on wet wound care  Safe for baby to consume    Ed  On deep latch in football hold  U-shape hold to the breast  Pillows to lift mom and baby  Pillows in lower lumbar support  With education and guidance, mom latched baby on the left breast in football hold  Active, coordinated sucking  Ed  On feeding on demand  And meeting early feeding cues  Cluster feeding reviewed  Discussed baby and me appt - mom will call    Mom has a anjelica rowe    Rsb/dc reviewed; handout on mother led latch and latch check list    Enc  To call lactation     Provided education on alignment of nose to breast; bring baby to breast and not breast to baby; support head with opp  Hand in cross cradle; use pillows to lift baby to be belly to belly; ear, shoulder, hip alignment; Support mother's back and place self in comfortable position to support bringing baby to the breast  Shoulders should be down and away from ears  Education on positioning and alignment   Mom is encouraged to:     - Bring baby up to the breast (use of pillows to elevate so baby's torso is against mom's breasts)   - Skin to skin for feedings with top hand exposed to show signs of satiation   - Chin deep into breast tissue (make baby look up to the nipple)   - nose aligned to the nipple   -Wait for wide gape, drag chin on the breast so nipple is aimed at the upper, back palate  - Cheek should be touching breast   - Deep, firm hold of baby with ear, shoulder, hip alignment    Provided demonstration, education and support of deep latch to breast by placing the nipple to the nose, dragging down to chin to achieve a wide latch  Bring baby to the breast, not breast to baby  Move your shoulders down and away from your ears  Look for ear, shoulder, hip alignment  Baby's upper and lower lip should be flanged on the breast     To help your nipples heal, in addition to paying close attention to latch and positioning, apply protective ointment after feeding or pumping and cover with an occlusive dressing  Nurse on demand: when baby gives hunger cues; when your breasts feel full, or at least every 3 hours during the day and every 5 hours at night counting from the beginning of one feeding to the beginning of the next; which ever comes first  When sucking and swallowing slow, gently compress the breast to restart flow  If active suck-swallow does not restart, gently remove the baby and offer the other breast; offering up to "four" breasts per feeding  Discussed 2nd night syndrome and ways to calm infant  Hand out given  Information on hand expression given  Discussed benefits of knowing how to manually express breast including stimulating milk supply, softening nipple for latch and evacuating breast in the event of engorgement  Information on hand expression given  Discussed benefits of knowing how to manually express breast including stimulating milk supply, softening nipple for latch and evacuating breast in the event of engorgement  Mom is encouraged to place baby skin to skin for feedings  Skin to skin education provided for baby placement on mother's chest, baby only in diaper, blankets below shoulders on baby's back  Skin to skin is encouraged to continue at home for feedings and between feedings  Worked on positioning infant up at chest level and starting to feed infant with nose arriving at the nipple   Then, using areolar compression to achieve a deep latch that is comfortable and exchanges optimum amounts of milk  - Start feedings on breast that last feeding ended   - allow no more than 3 hours between breast feeding sessions   - time between feedings is counted from the beginning of the first feed to the beginning of the next feeding session    Reviewed early signs of hunger, including tensing of hands and shoulders - no need to wait for open eyes  Crying is a late hunger sign  If baby is crying, soothe baby first and then attempt to latch  Reviewed normal sucking patterns: transition from stimulation to nutritive to release or non-nutritive  The goal is to see and hear lots of swallowing  Reviewed normal nursing pattern: infant could latch on one breast up to 30 minutes or until releases on own  Signs of satiation is open hand with fingers that do not grab your finger  Discussed difference in sensation of non-nutritive v nutritive sucking    Met with mother  Provided mother with Ready, Set, Baby booklet  Discussed Skin to Skin contact an benefits to mom and baby  Talked about the delay of the first bath until baby has adjusted  Spoke about the benefits of rooming in  Feeding on cue and what that means for recognizing infant's hunger  Avoidance of pacifiers for the first month discussed  Talked about exclusive breastfeeding for the first 6 months  Positioning and latch reviewed as well as showing images of other feeding positions  Discussed the properties of a good latch in any position  Reviewed hand/manual expression  Discussed s/s that baby is getting enough milk and some s/s that breastfeeding dyad may need further help  Gave information on common concerns, what to expect the first few weeks after delivery, preparing for other caregivers, and how partners can help  Resources for support also provided  Encouraged parents to call for assistance, questions, and concerns about breastfeeding    Extension provided  Provided education on growth spurts, when to introduce bottles; paced bottle feeding, and non-nutritive suck at the breast  Provided education on Signs of satiation  Encouraged to call lactation to observe a latch prior to discharge for reassurance  Encouraged to call baby and me with any questions and closely monitor output        [place], [address] [place] 3/13/2023 10:52 AM

## 2023-03-14 NOTE — UTILIZATION REVIEW
NOTIFICATION OF ADMISSION DISCHARGE   This is a Notification of Discharge from [address]  Please be advised that this patient has been discharge from our facility  Below you will find the admission and discharge date and time including the patient’s disposition  UTILIZATION REVIEW CONTACT:  Jhon Sands  Utilization   Network Utilization Review Department  Phone: [phone] x carefully listen to the prompts  All voicemails are confidential   Email: [email or web address]     ADMISSION INFORMATION  PRESENTATION DATE: 3/11/2023  7:47 PM  OBERVATION ADMISSION DATE:   INPATIENT ADMISSION DATE: 3/11/23  8:34 PM   DISCHARGE DATE: 3/13/2023  5:00 PM   DISPOSITION:Home/Self Care    IMPORTANT INFORMATION:  Send all requests for admission clinical reviews, approved or denied determinations and any other requests to dedicated fax number below belonging to the campus where the patient is receiving treatment   List of dedicated fax numbers:  [address] DENIALS (Administrative/Medical Necessity) [phone]   [address] (Maternity/NICU/Pediatrics) [phone]   [facility] [phone]   [facility] 87 [phone]   [address] [phone]   220 [facility] [phone]   90 [facility] [phone]   [address] [place] [address] 119 [phone]   [facility]  [phone]   [facility] [phone]   412 [facility] [address] [facility] [phone]

## 2023-03-18 LAB — PLACENTA IN STORAGE: NORMAL

## 2023-03-24 ENCOUNTER — PATIENT MESSAGE (OUTPATIENT)
Dept: OBGYN CLINIC | Facility: CLINIC | Age: 28
End: 2023-03-24

## 2023-03-24 DIAGNOSIS — F41.8 POSTPARTUM ANXIETY: Primary | ICD-10-CM

## 2023-03-27 DIAGNOSIS — F41.8 POSTPARTUM ANXIETY: Primary | ICD-10-CM

## 2023-03-27 NOTE — TELEPHONE ENCOUNTER
Please call   Zoloft 50mg sent  Offer baby and me  Remind pt of PP exam where we will further discuss her concerns

## 2023-04-26 ENCOUNTER — POSTPARTUM VISIT (OUTPATIENT)
Dept: OBGYN CLINIC | Facility: CLINIC | Age: 28
End: 2023-04-26

## 2023-04-26 VITALS
DIASTOLIC BLOOD PRESSURE: 60 MMHG | WEIGHT: 180 LBS | HEIGHT: 66 IN | SYSTOLIC BLOOD PRESSURE: 102 MMHG | BODY MASS INDEX: 28.93 KG/M2

## 2023-04-26 PROBLEM — O26.20 HISTORY OF RECURRENT ABORTION, ANTEPARTUM: Status: RESOLVED | Noted: 2022-08-29 | Resolved: 2023-04-26

## 2023-04-26 PROBLEM — O09.299 HISTORY OF MACROSOMIA IN INFANT IN PRIOR PREGNANCY, CURRENTLY PREGNANT: Status: RESOLVED | Noted: 2022-09-13 | Resolved: 2023-04-26

## 2023-04-26 PROBLEM — Z34.80 SUPERVISION OF OTHER NORMAL PREGNANCY, ANTEPARTUM: Status: RESOLVED | Noted: 2022-08-29 | Resolved: 2023-04-26

## 2023-04-26 NOTE — PROGRESS NOTES
"Assessment/Plan     Normal postpartum exam      1  Contraception: condoms  2  Annual exam due in [place]  3  Lactation consult, [address] information discussed  4  Increase activity as tolerated, may resume all normal activity at 6 weeks postpartum  5  Anticipated return to work: 6 - 12 weeks post partum  Joselo Freitas is a 24 y o  female who presents for a postpartum visit  She is 6 weeks postpartum following a spontaneous vaginal delivery  I have fully reviewed the prenatal and intrapartum course  The delivery was at 39 4 gestational weeks  Anesthesia: epidural  Laceration: none  Bleeding no bleeding  Bowel function is normal  Bladder function is normal  Patient has not traveled outside the [place]  within the last 14 days  been sexually active  Desired contraception method is condoms  Tried mirena IUD in the past, but mood was better after removal of the IUD  Pt  Is feeling well on zoloft  Postpartum depression screening: negative  EPDS : 7    Baby's course has been unremarkable  Baby is feeding by breast     Last Pap :unsure  Gestational Diabetes: no  Pregnancy Complications: none    The following portions of the patient's history were reviewed and updated as appropriate: allergies, past family history, past social history and problem list       Current Outpatient Medications:   •  Prenatal Multivit-Min-Fe-FA (PRENATAL VITAMINS PO), Take 1 tablet by mouth daily, Disp: , Rfl:   •  sertraline (Zoloft) 50 mg tablet, Take 1 tablet (50 mg total) by mouth daily, Disp: 90 tablet, Rfl: 1    No Known Allergies    Review of Systems  Constitutional: no fever, feels well  Breasts: no complaints of breast pain, breast lump, or nipple discharge  Gastrointestinal: no complaints nausea, vomiting  Genitourinary: as noted in HPI    Neurological: no complaints of headache      Objective      /60 (BP Location: Left arm, Patient Position: Sitting, Cuff Size: Standard)   Ht 5' 6\" " (1 676 m)   Wt 81 6 kg (180 lb)   LMP 06/08/2022 (Exact Date)   Breastfeeding Yes   BMI 29 05 kg/m²     OBGyn Exam  General: alert and oriented, in no acute distress

## 2023-05-08 ENCOUNTER — TELEMEDICINE (OUTPATIENT)
Dept: BEHAVIORAL/MENTAL HEALTH CLINIC | Facility: CLINIC | Age: 28
End: 2023-05-08

## 2023-05-08 DIAGNOSIS — F41.8 POSTPARTUM ANXIETY: Primary | ICD-10-CM

## 2023-05-09 NOTE — PSYCH
"Virtual AWV Consent    Verification of patient location: confirmed at home     Patient is located at Home in the following state in which I hold an active license [place]    The patient was identified by name and date of birth  Juan Pablo Sow was informed that this is a telemedicine visit and that the visit is being conducted through the [employer]  She agrees to proceed     My office door was closed  No one else was in the room  She acknowledged consent and understanding of privacy and security of the video platform  The patient has agreed to participate and understands they can discontinue the visit at any time  Patient is aware this is a billable service  Reason for visit is therapy follow up     Encounter provider VARSHA Blandon [facility]    Provider located at [place] [phone]      Recent Visits  Date Type Provider Dept   05/08/23 [address] [place] [address] recent visits within past 7 days and meeting all other requirements  Future Appointments  No visits were found meeting these conditions  Showing future appointments within next 150 days and meeting all other requirements           Visit Time  Total Visit Duration: 52 minutes     Behavioral Health Psychotherapy Progress Note    Psychotherapy Provided: Individual Psychotherapy     No diagnosis found  Goals addressed in session: Goal 1     DATA: Dorothea Florez presented virtually in session  She confirmed that she is at home  The session was focused on review of her YBOCS  She endorses what seem to be sexual obsessions, the obsession of 'need to know\", and some obsessiveness with other themes of harm  Dorothea Florez already reports significant insight to her symptom presentation and reports that theses obsessions are only mildly disturbing   For homework, she will work on identifying the obsessions/compulsions that are most " "distressing in order to work on starting to exposure to and address in session  Akin Echeverria will continue to meet biweekly with this provider  During this session, this clinician used the following therapeutic modalities: Engagement Strategies and Cognitive Behavioral Therapy    Substance Abuse was not addressed during this session  If the client is diagnosed with a co-occurring substance use disorder, please indicate any changes in the frequency or amount of use: Akin Echeverria does not use substances  Stage of change for addressing substance use diagnoses: No substance use/Not applicable    ASSESSMENT:  Kimani Mi presents with a Euthymic/ normal mood  her affect is Normal range and intensity, which is congruent, with her mood and the content of the session  The client has made progress on their goals  Akin Echeverria is highly motivated it seems to work on her OCD and has good insight to her triggers  Kimani Mi presents with a none risk of suicide, none risk of self-harm, and none risk of harm to others  For any risk assessment that surpasses a \"low\" rating, a safety plan must be developed  A safety plan was indicated: no  If yes, describe in detail NA    PLAN: Between sessions, Kimani Mi will start to work on identifying behavioral patterns and triggers  At the next session, the therapist will use Engagement Strategies and Cognitive Behavioral Therapy to address OCD/anxiety  Behavioral Health Treatment Plan and Discharge Planning: Kimani Mi is aware of and agrees to continue to work on their treatment plan  They have identified and are working toward their discharge goals   yes    Visit start and stop times:    05/08/2023  Start Time: 1506  Stop Time: 1558  Total Visit Time: 52 minutes  "

## 2023-05-23 ENCOUNTER — TELEMEDICINE (OUTPATIENT)
Dept: BEHAVIORAL/MENTAL HEALTH CLINIC | Facility: CLINIC | Age: 28
End: 2023-05-23

## 2023-05-23 DIAGNOSIS — F41.8 POSTPARTUM ANXIETY: Primary | ICD-10-CM

## 2023-05-23 NOTE — PSYCH
Virtual AWV Consent    Verification of patient location: confirmed at home     Patient is located at Home in the following state in which I hold an active license [place]    The patient was identified by name and date of birth  Scot Deutsch was informed that this is a telemedicine visit and that the visit is being conducted through the [employer]  She agrees to proceed     My office door was closed  The patient was notified the following individuals were present in the room No one in the room  She acknowledged consent and understanding of privacy and security of the video platform  The patient has agreed to participate and understands they can discontinue the visit at any time  Patient is aware this is a billable service  Reason for visit is therapy follow up     Encounter provider [facility]    Provider located at [facility] [phone]      Recent Visits  No visits were found meeting these conditions  Showing recent visits within past 7 days and meeting all other requirements  Today's Visits  Date Type Provider Dept   05/23/23 Telemedicine [facility]   Showing today's visits and meeting all other requirements  Future Appointments  No visits were found meeting these conditions  Showing future appointments within next 150 days and meeting all other requirements           Visit Time  Total Visit Duration: 50 minutes     Behavioral Health Psychotherapy Progress Note    Psychotherapy Provided: Individual Psychotherapy     No diagnosis found  Goals addressed in session: Goal 1 and Goal 2 ; plan created virtually in session  DATA: Sylwia Brannon presented virtually for her session  The session was focused on review of her mood and anxiety over the last two weeks   She reports that the past two weeks have gone well as her father has been in town and that has offered her extra "support  She has been able to take time for herself and to get a few things done around the home that she needs to do  Discussed ways of communicating her needs with her  and taking time together in order to get things done around the home as well as ways to help situate her children in order to take care of things  This therapist also reviewed the importance of continuing to work on getting time in for herself and suggested externalizing the list of things that she needs to get done, which John Mosqueda agreed would be a good plan  This therapist will meet back with Jessicachloe Shepardalejandro following her family trip to [place]  During this session, this clinician used the following therapeutic modalities: Engagement Strategies and Cognitive Behavioral Therapy    Substance Abuse was not addressed during this session  If the client is diagnosed with a co-occurring substance use disorder, please indicate any changes in the frequency or amount of use: Jessicachloe Rodrigueztruman does not use substances   Stage of change for addressing substance use diagnoses: No substance use/Not applicable    ASSESSMENT:  Christoph Beaver presents with a Euthymic/ normal mood  her affect is Normal range and intensity, which is congruent, with her mood and the content of the session  The client has    made progress on their goals  John Mosqueda is motivated to make progress on her goals and to work on strategies to help her with mood management  Christoph Beaver presents with a none risk of suicide, none risk of self-harm, and none risk of harm to others  For any risk assessment that surpasses a \"low\" rating, a safety plan must be developed  A safety plan was indicated: no  If yes, describe in detail NA    PLAN: Between sessions, Christoph Beaver will work on communicating and strategies to help her calm down when overstimulated     At the next session, the therapist will use Engagement Strategies and Cognitive Behavioral Therapy to address mood and anxiety "      Behavioral Health Treatment Plan and Discharge Planning: Nabil Schmitt is aware of and agrees to continue to work on their treatment plan  They have identified and are working toward their discharge goals   yes    Visit start and stop times:    05/23/23  Start Time: 1508   Stop Time: 1558  Total Time: 50 minutes

## 2023-05-24 NOTE — BH TREATMENT PLAN
Outpatient Behavioral Health Psychotherapy Treatment Plan    Casa Mishrady  1995     Date of Initial Psychotherapy Assessment: April 19, 2023   Date of Current Treatment Plan: 05/23/2023  Treatment Plan Target Date: November 223  Treatment Plan Expiration Date: November 2023    Diagnosis:   1  Postpartum anxiety            Area(s) of Need: Anxiety Management, Mood Management     Long Term Goal 1 (in the client's own words): Gregory Benton would like to work on her anxiety     Stage of Change: Action    Target Date for completion: November 2023     Anticipated therapeutic modalities: Cognitive behavioral therapy, supportive psychotherapy      People identified to complete this goal: aron Coe       Objective 1: (identify the means of measuring success in meeting the objective): Gregory Benton will work on learning the cognitive behavioral model of anxiety in order to assess her safety seeking and reassurance seeking behaviors  She will learn methods of challenging irrational thoughts and be able to recognize distorted thoughts 7 out of 10 times  Objective 2: (identify the means of measuring success in meeting the objective): Gregory Benton will work on learning methods of asserting her needs when feeling anxious and finding different ways of mitigating her anxiety symptoms  She will work on finding coping skills that she can implement in the moments of feeling very overwhelmed  Long Term Goal 2 (in the client's own words): Gregory Benton will work on mood management  Stage of Change: Action    Target Date for completion: November 2023     Anticipated therapeutic modalities: Cognitive behavioral therapy, supportive psychotherapy      People identified to complete this goal: aron Coe      Objective 1: (identify the means of measuring success in meeting the objective): Gregory Benton will work on developing a scheduled for daily behavioral activation and week behavioral activation  Objective 2: (identify the means of measuring success in meeting the objective): Valora Dance will identify her support network and utilize them weekly as needed  Long Term Goal 3 (in the client's own words): NA    Stage of Change: NA    Target Date for completion: NA     Anticipated therapeutic modalities: NA     People identified to complete this goal: NA      Objective 1: (identify the means of measuring success in meeting the objective): NA      Objective 2: (identify the means of measuring success in meeting the objective): NA     I am currently under the care of a [facility] psychiatric provider: no    My [facility] psychiatric provider is: NA    I am currently taking psychiatric medications: No    I feel that I will be ready for discharge from mental health care when I reach the following (measurable goal/objective): When mood and anxiety are managed     For children and adults who have a legal guardian:   Has there been any change to custody orders and/or guardianship status? NA  If yes, attach updated documentation  I have not created my Crisis Plan and have been offered a copy of this plan    [address]: Diagnosis and Treatment Plan explained to [employer] acknowledges an understanding of their diagnosis  Holly Tolentino agrees to this treatment plan  I have been offered a copy of this Treatment Plan  Yes; this plan was created virtually on 5/23/2023 with the consent of Niki

## 2023-06-06 ENCOUNTER — TELEMEDICINE (OUTPATIENT)
Dept: BEHAVIORAL/MENTAL HEALTH CLINIC | Facility: CLINIC | Age: 28
End: 2023-06-06

## 2023-06-06 DIAGNOSIS — F41.8 POSTPARTUM ANXIETY: Primary | ICD-10-CM

## 2023-06-06 PROCEDURE — NOSHOW: Performed by: COUNSELOR

## 2023-06-07 NOTE — PSYCH
No Call  No Show  No Charge    Aniket Ramírez no showed 06/07/23 appointment , staff called and left message to reschedule appointment     Treatment Plan not due at this session

## 2023-07-26 ENCOUNTER — OFFICE VISIT (OUTPATIENT)
Dept: OBGYN CLINIC | Facility: CLINIC | Age: 28
End: 2023-07-26
Payer: COMMERCIAL

## 2023-07-26 VITALS
SYSTOLIC BLOOD PRESSURE: 114 MMHG | WEIGHT: 197.4 LBS | HEIGHT: 66 IN | DIASTOLIC BLOOD PRESSURE: 72 MMHG | BODY MASS INDEX: 31.72 KG/M2

## 2023-07-26 DIAGNOSIS — Z12.4 ENCOUNTER FOR SCREENING FOR MALIGNANT NEOPLASM OF CERVIX: ICD-10-CM

## 2023-07-26 DIAGNOSIS — Z01.419 WELL WOMAN EXAM WITH ROUTINE GYNECOLOGICAL EXAM: Primary | ICD-10-CM

## 2023-07-26 PROCEDURE — S0612 ANNUAL GYNECOLOGICAL EXAMINA: HCPCS | Performed by: OBSTETRICS & GYNECOLOGY

## 2023-07-26 PROCEDURE — G0145 SCR C/V CYTO,THINLAYER,RESCR: HCPCS | Performed by: OBSTETRICS & GYNECOLOGY

## 2023-07-26 NOTE — PROGRESS NOTES
ASSESSMENT & PLAN:       The following were reviewed in today's visit: ASCCP guidelines for pap testing, breast self exam and family planning choices. All questions have been answered to her satisfaction. Patient to return to office yearly for annual exam.     CC:  Annual Gynecologic Examination    HPI: Ese Harris is a 24 y.o. [id number] who presents for annual gynecologic examination. She has the following concerns:  None. Amenorrhea since  3-4 months ago. Condoms for contraception. No concerns today. Health Maintenance:     Recommend at least 150 minutes a week of moderate intensity activity such as brisk walking and at least 2 days a week of activities that strengthen muscles. She wears her seatbelt routinely. She does perform self breast exams. History reviewed. No pertinent past medical history. Past Surgical History:   Procedure Laterality Date   • HAND SURGERY      age 17 - trauma   • TONSILLECTOMY AND ADENOIDECTOMY     • WISDOM TOOTH EXTRACTION         Past OB/Gyn History:    Menstrual History:  OB History        4    Para   2    Term   2       0    AB   2    Living   2       SAB   2    IAB   0    Ectopic   0    Multiple   0    Live Births   2                  No LMP recorded. History of sexually transmitted infection No. Patient is not interested in STI testing today. Patient is currently sexually active. heterosexual   Birth control: condoms.   Last Pap  2019 :   Gardisil vaccination: no    Family History   Problem Relation Age of Onset   • Hyperlipidemia Mother    • Retinal detachment Father    • Asthma Sister    • Depression Sister    • Anxiety disorder Sister    • Anxiety disorder Sister    • Depression Sister    • Pseudotumor cerebri Sister    • Anxiety disorder Sister    • Depression Sister    • Anxiety disorder Sister    • Depression Sister    • Speech disorder Daughter         delay   • No Known Problems Maternal Grandmother    • Leukemia Maternal Grandfather    • No Known Problems Paternal Grandmother    • No Known Problems Paternal Grandfather        Social History:  Social History     Socioeconomic History   • Marital status: /Civil Union     Spouse name: Not on file   • Number of children: Not on file   • Years of education: Not on file   • Highest education level: Not on file   Occupational History   • Not on file   Tobacco Use   • Smoking status: Never   • Smokeless tobacco: Never   Vaping Use   • Vaping Use: Never used   Substance and Sexual Activity   • Alcohol use: Never   • Drug use: Never   • Sexual activity: Yes     Partners: Male     Birth control/protection: Condom Male   Other Topics Concern   • Not on file   Social History Narrative   • Not on file     Social Determinants of Health     Financial Resource Strain: Not on file   Food Insecurity: Not on file   Transportation Needs: Not on file   Physical Activity: Not on file   Stress: Not on file   Social Connections: Not on file   Intimate Partner Violence: Not on file   Housing Stability: Not on file     Presently lives with spouse and kids. She feels safe at home. Patient is . 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 HENT: Negative for congestion, ear pain, hearing loss and sore throat. Respiratory: Negative for cough, chest tightness and shortness of breath. Cardiovascular: Negative for chest pain and leg swelling. Gastrointestinal: Negative for abdominal pain, constipation, diarrhea, nausea and vomiting.    Genitourinary: Negative for difficulty urinating, dysuria, frequency, menstrual problem, pelvic pain, vaginal discharge and vaginal pain. Skin: Negative for color change and rash. Neurological: Negative for dizziness, numbness and headaches. Psychiatric/Behavioral: Negative for agitation and confusion. Physical Exam:  /72 (BP Location: Right arm, Patient Position: Sitting, Cuff Size: Standard)   Ht 5' 6" (1.676 m)   Wt 89.5 kg (197 lb 6.4 oz)   Breastfeeding Yes   BMI 31.86 kg/m²    Physical Exam  Constitutional:       General: She is not in acute distress. Appearance: Normal appearance. She is obese. Genitourinary:      Vulva, bladder, rectum and urethral meatus normal.      No lesions in the vagina. Right Labia: No rash, tenderness or lesions. Left Labia: No tenderness, lesions or rash. No labial fusion noted. No vaginal discharge or tenderness. No vaginal prolapse present. No vaginal atrophy present. Right Adnexa: not tender, not full and no mass present. Left Adnexa: not tender, not full and no mass present. No cervical motion tenderness or friability. Uterus is not enlarged or prolapsed. Breasts:     Breast exam comments: Breastfeeding, deferred. HENT:      Head: Normocephalic and atraumatic. Nose: Nose normal.   Eyes:      Conjunctiva/sclera: Conjunctivae normal.      Pupils: Pupils are equal, round, and reactive to light. Cardiovascular:      Rate and Rhythm: Normal rate and regular rhythm. Pulses: Normal pulses. Heart sounds: Normal heart sounds. Pulmonary:      Effort: Pulmonary effort is normal. No respiratory distress. Breath sounds: Normal breath sounds. No wheezing. Abdominal:      General: Abdomen is flat. There is no distension. Palpations: Abdomen is soft. Tenderness: There is no abdominal tenderness. There is no guarding. Musculoskeletal:         General: Normal range of motion.       Cervical back: Normal range of motion and neck supple. Neurological:      General: No focal deficit present. Mental Status: She is alert and oriented to person, place, and time. Mental status is at baseline. Skin:     General: Skin is warm and dry. Psychiatric:         Mood and Affect: Mood normal.         Behavior: Behavior normal.         Thought Content: Thought content normal.         Judgment: Judgment normal.   Vitals and nursing note reviewed.

## 2023-08-01 LAB
LAB AP GYN PRIMARY INTERPRETATION: NORMAL
Lab: NORMAL

## 2023-10-03 DIAGNOSIS — F41.8 POSTPARTUM ANXIETY: ICD-10-CM

## 2023-12-04 ENCOUNTER — DOCUMENTATION (OUTPATIENT)
Dept: BEHAVIORAL/MENTAL HEALTH CLINIC | Facility: CLINIC | Age: 28
End: 2023-12-04

## 2023-12-04 NOTE — PROGRESS NOTES
Psychotherapy Discharge Summary    Preferred Name: Renetta Aguirre  YOB: 1995    Admission date to psychotherapy: April 5, 2023    Referred by: Maddy Campos    Presenting Problem: Postpartum depression/anxiety    Course of treatment included : individual therapy     Progress/Outcome of Treatment Goals (brief summary of course of treatment) Adela Ott only presented for the intake assessment and a couple of sessions. She stopped attending sessions and did not reschedule. Treatment Complications (if any): None noted; lack of follow up     Treatment Progress: good    Current [facility] Psychiatric Provider: None noted. Discharge Medications include: Please see list    Discharge Date: Decemebr 3, 2023    Discharge Diagnosis: No diagnosis found. Criteria for Discharge: Adela Ott stopped attending sessions and has not been seen in 6 months. Aftercare recommendations include (include specific referral names and phone numbers, if appropriate): Adela Ott is encouraged to speak with her provider if she feels that she needs future psychiatric care. If in crisis, she is encouraged to go to her local ED or call 911.      Prognosis: good

## 2023-12-12 ENCOUNTER — TELEPHONE (OUTPATIENT)
Dept: PSYCHIATRY | Facility: CLINIC | Age: 28
End: 2023-12-12

## 2024-07-04 DIAGNOSIS — Z00.6 ENCOUNTER FOR EXAMINATION FOR NORMAL COMPARISON OR CONTROL IN CLINICAL RESEARCH PROGRAM: ICD-10-CM

## 2024-07-08 ENCOUNTER — APPOINTMENT (OUTPATIENT)
Dept: LAB | Facility: CLINIC | Age: 29
End: 2024-07-08

## 2024-07-08 DIAGNOSIS — Z00.6 ENCOUNTER FOR EXAMINATION FOR NORMAL COMPARISON OR CONTROL IN CLINICAL RESEARCH PROGRAM: ICD-10-CM

## 2024-07-08 PROCEDURE — 36415 COLL VENOUS BLD VENIPUNCTURE: CPT

## 2024-08-06 LAB
APOB+LDLR+PCSK9 GENE MUT ANL BLD/T: NOT DETECTED
BRCA1+BRCA2 DEL+DUP + FULL MUT ANL BLD/T: NOT DETECTED
MLH1+MSH2+MSH6+PMS2 GN DEL+DUP+FUL M: NOT DETECTED

## 2024-08-07 ENCOUNTER — ANNUAL EXAM (OUTPATIENT)
Dept: OBGYN CLINIC | Facility: CLINIC | Age: 29
End: 2024-08-07
Payer: COMMERCIAL

## 2024-08-07 VITALS
HEIGHT: 66 IN | SYSTOLIC BLOOD PRESSURE: 124 MMHG | DIASTOLIC BLOOD PRESSURE: 72 MMHG | WEIGHT: 188 LBS | BODY MASS INDEX: 30.22 KG/M2

## 2024-08-07 DIAGNOSIS — Z01.419 WOMEN'S ANNUAL ROUTINE GYNECOLOGICAL EXAMINATION: Primary | ICD-10-CM

## 2024-08-07 DIAGNOSIS — F41.8 POSTPARTUM ANXIETY: ICD-10-CM

## 2024-08-07 PROCEDURE — S0610 ANNUAL GYNECOLOGICAL EXAMINA: HCPCS | Performed by: PHYSICIAN ASSISTANT

## 2024-08-07 NOTE — PROGRESS NOTES
ASSESSMENT & PLAN:   Diagnoses and all orders for this visit:    Women's annual routine gynecological examination    Postpartum anxiety  -     sertraline (Zoloft) 50 mg tablet; Take 1.5 tablets (75 mg total) by mouth daily          The following were reviewed in today's visit: ASCCP guidelines, Gardisil vaccination, STD testing breast self exam, STD testing, exercise, and healthy diet.    Patient to return to office in yearly for annual exam.     All questions have been answered to her satisfaction.        CC:  Annual Gynecologic Examination  Chief Complaint   Patient presents with    Gynecologic Exam     Pt is here for her annual exam; pap is current. Pt currently has ringworm.   Pap 3/18/2019 neg  Pap 2023 neg       HPI: Niki Chambers is a 28 y.o.  who presents for annual gynecologic examination.  She has the following concerns:  Patient doing well on her zoloft but feels she would benefit from a dose increase. She denies Si/HI. Will increase to 75mg daily and recommend patient follow up with PCP      Health Maintenance:    Exercise: intermittently  Breast exams/breast awareness: yes    History reviewed. No pertinent past medical history.    Past Surgical History:   Procedure Laterality Date    HAND SURGERY      age 16 - trauma    TONSILLECTOMY AND ADENOIDECTOMY      WISDOM TOOTH EXTRACTION         Past OB/Gyn History:   Patient's last menstrual period was 2024.    Last Pap:  : no abnormalities  History of abnormal Pap smear: no  HPV vaccine completed: no    Patient is currently sexually active.   STD testing: no  Current contraception: condoms      Family History  Family History   Problem Relation Age of Onset    Hyperlipidemia Mother     Retinal detachment Father     Asthma Sister     Depression Sister     Anxiety disorder Sister     Anxiety disorder Sister     Depression Sister     Pseudotumor cerebri Sister     Anxiety disorder Sister     Depression Sister     Anxiety disorder Sister      Depression Sister     Speech disorder Daughter         delay    No Known Problems Maternal Grandmother     Leukemia Maternal Grandfather     No Known Problems Paternal Grandmother     No Known Problems Paternal Grandfather        Family history of uterine or ovarian cancer: no  Family history of breast cancer: no  Family history of colon cancer: no    Social History:  Social History     Socioeconomic History    Marital status: /Civil Union     Spouse name: Not on file    Number of children: Not on file    Years of education: Not on file    Highest education level: Not on file   Occupational History    Not on file   Tobacco Use    Smoking status: Never    Smokeless tobacco: Never   Vaping Use    Vaping status: Never Used   Substance and Sexual Activity    Alcohol use: Never    Drug use: Never    Sexual activity: Yes     Partners: Male     Birth control/protection: Condom Male   Other Topics Concern    Not on file   Social History Narrative    Not on file     Social Determinants of Health     Financial Resource Strain: Low Risk  (3/10/2024)    Received from [facility]    Overall Financial Resource Strain (CARDIA)     Difficulty of Paying Living Expenses: Not hard at all   Food Insecurity: No Food Insecurity (3/10/2024)    Received from [facility]    Hunger Vital Sign     Worried About Running Out of Food in the Last Year: Never true     Ran Out of Food in the Last Year: Never true   Transportation Needs: No Transportation Needs (3/10/2024)    Received from [facility]    PRAPARE - Transportation     Lack of Transportation (Medical): No     Lack of Transportation (Non-Medical): No   Physical Activity: Not on file   Stress: No Stress Concern Present (3/10/2024)    Received from [facility]    [employer] - Occupational Stress Questionnaire     Feeling of Stress : Only a little   Social Connections: Socially  "Integrated (3/10/2024)    Received from [facility]    Social Connection and Isolation Panel [NHANES]     Frequency of Communication with Friends and Family: More than three times a week     Frequency of Social Gatherings with Friends and Family: Twice a week     Attends Hoahaoism Services: More than 4 times per year     Active Member of Clubs or Organizations: Yes     Attends Club or Organization Meetings: More than 4 times per year     Marital Status:    Intimate Partner Violence: Not At Risk (3/10/2024)    Received from [facility]    Humiliation, Afraid, Rape, and Kick questionnaire     Fear of Current or Ex-Partner: No     Emotionally Abused: No     Physically Abused: No     Sexually Abused: No   Housing Stability: Not on file     Domestic violence screen: negative    Allergies:  No Known Allergies    Medications:    Current Outpatient Medications:     Prenatal Multivit-Min-Fe-FA (PRENATAL VITAMINS PO), Take 1 tablet by mouth daily, Disp: , Rfl:     sertraline (Zoloft) 50 mg tablet, Take 1.5 tablets (75 mg total) by mouth daily, Disp: 135 tablet, Rfl: 0    Review of Systems:  Review of Systems   Constitutional:  Negative for chills, fever and unexpected weight change.   Respiratory:  Negative for shortness of breath.    Cardiovascular:  Negative for chest pain.   Gastrointestinal:  Negative for abdominal pain, diarrhea, nausea and vomiting.   Skin:  Negative for rash.   Psychiatric/Behavioral:  Negative for dysphoric mood. The patient is not nervous/anxious.          Physical Exam:  /72   Ht 5' 6\" (1.676 m)   Wt 85.3 kg (188 lb)   LMP 08/02/2024   BMI 30.34 kg/m²    Physical Exam  Constitutional:       Appearance: Normal appearance.   Genitourinary:      Vulva and urethral meatus normal.      No lesions in the vagina.      Right Labia: No rash or lesions.     Left Labia: No lesions or rash.     No vaginal discharge, erythema or bleeding.        Right Adnexa: " not tender and no mass present.     Left Adnexa: not tender and no mass present.     No cervical discharge or lesion.      Uterus is not tender.   Breasts:     Breasts are symmetrical.      Right: No mass or skin change.      Left: No mass or skin change.   HENT:      Head: Normocephalic and atraumatic.   Cardiovascular:      Rate and Rhythm: Normal rate and regular rhythm.      Heart sounds: Normal heart sounds. No murmur heard.     No friction rub. No gallop.   Pulmonary:      Effort: Pulmonary effort is normal.      Breath sounds: Normal breath sounds. No wheezing, rhonchi or rales.   Abdominal:      General: Abdomen is flat. There is no distension.      Palpations: Abdomen is soft.      Tenderness: There is no abdominal tenderness.   Musculoskeletal:      Cervical back: Neck supple.   Lymphadenopathy:      Upper Body:      Right upper body: No axillary adenopathy.      Left upper body: No axillary adenopathy.   Neurological:      General: No focal deficit present.      Mental Status: She is alert.   Skin:     General: Skin is warm and dry.   Psychiatric:         Mood and Affect: Mood normal.         Behavior: Behavior normal.   Vitals reviewed.

## 2024-11-03 DIAGNOSIS — F41.8 POSTPARTUM ANXIETY: ICD-10-CM

## 2025-02-07 DIAGNOSIS — F41.8 POSTPARTUM ANXIETY: ICD-10-CM

## 2025-03-11 ENCOUNTER — ULTRASOUND (OUTPATIENT)
Dept: OBGYN CLINIC | Facility: CLINIC | Age: 30
End: 2025-03-11
Payer: COMMERCIAL

## 2025-03-11 VITALS — DIASTOLIC BLOOD PRESSURE: 72 MMHG | WEIGHT: 194 LBS | SYSTOLIC BLOOD PRESSURE: 110 MMHG | BODY MASS INDEX: 31.31 KG/M2

## 2025-03-11 DIAGNOSIS — Z32.01 PREGNANCY, CONFIRMED, NOT FIRST: Primary | ICD-10-CM

## 2025-03-11 PROCEDURE — 99213 OFFICE O/P EST LOW 20 MIN: CPT

## 2025-03-11 PROCEDURE — 76817 TRANSVAGINAL US OBSTETRIC: CPT

## 2025-03-11 RX ORDER — LORATADINE 10 MG/1
10 TABLET ORAL DAILY
COMMUNITY

## 2025-03-11 NOTE — PROGRESS NOTES
Assessment/Plan:  Diagnoses and all orders for this visit:    Pregnancy, confirmed, not first  -     Ambulatory Referral to Maternal Fetal Medicine; Future  -     AMB US OB < 14 weeks single or first gestation level 1    Other orders  -     loratadine (CLARITIN) 10 mg tablet; Take 10 mg by mouth daily      - Viable IUP @ 8w 6d EGA  - LYNDA 10/15/2025  - Continue PNV  - Patient to call for concerns  - RTO ~ 10-12 weeks for OB intake  - call MFM for 13 week NT scan/genetic testing.     Subjective:       Patient ID: Niki Chambers 1995     Niki Chambers is a 29 y.o.  presenting to the office for pregnancy confirmation. Patient's last menstrual period was 2025 (exact date)., placing her at 8w6d today with LYNDA of 10/15/2025. She is feeling well.      Regular periods:yes  Nausea:yes  Vomiting: no  Bleeding: no  Cramping: no  Headaches: yes, random a few times per week. We discussed Mg, riboflavin, hydration, Tylenol + caffeine   Fatigue: yes  Constipation: no  Blood type, if known: AB+       OB History    Para Term  AB Living   5 2 2 0 2 2   SAB IAB Ectopic Multiple Live Births   2 0 0 0 2      # Outcome Date GA Lbr Red/2nd Weight Sex Type Anes PTL Lv   5 Current            4 Term 23 39w4d / 00:02 3880 g (8 lb 8.9 oz) M Vag-Spont EPI N PRADIP   3 Term 21 40w4d / 00:29 4245 g (9 lb 5.7 oz) F Vag-Spont Local  PRADIP   2 SAB 2019 5w0d    SAB      1 2019 6w0d             Birth Comments: MIssed ab Medical ab         The following portions of the patient's history were reviewed and updated as appropriate: allergies, current medications, past family history, past medical history, past social history, past surgical history, and problem list.    Allergies:  Patient has no known allergies.    Medications:    Current Outpatient Medications:     loratadine (CLARITIN) 10 mg tablet, Take 10 mg by mouth daily, Disp: , Rfl:     Prenatal Multivit-Min-Fe-FA (PRENATAL VITAMINS PO), Take 1  tablet by mouth daily, Disp: , Rfl:     sertraline (ZOLOFT) 50 mg tablet, TAKE 1 AND 1/2 TABLETS(75 MG) BY MOUTH DAILY, Disp: 135 tablet, Rfl: 1      Review of Systems:   Review of Systems   Constitutional:  Negative for chills and fever.   Respiratory:  Negative for shortness of breath.    Cardiovascular:  Negative for chest pain.   Gastrointestinal:  Negative for abdominal pain, constipation and diarrhea.   Genitourinary:  Negative for dysuria, frequency, pelvic pain, vaginal discharge and vaginal pain.   Psychiatric/Behavioral:  Negative for self-injury and suicidal ideas.      Objective:    Visit Vitals  /72   Wt 88 kg (194 lb)   LMP 01/08/2025 (Exact Date)   BMI 31.31 kg/m²   OB Status Pregnant   Smoking Status Never   BSA 1.97 m²     GEN: The patient was alert and oriented x3, pleasant well-appearing female in no acute distress.   PULM: nonlabored respirations  MSK: Normal gait  : WNL  Skin: warm, dry  Neuro: no focal deficits  Psych: normal affect and judgement, cooperative    Ultrasound:     Viability US     Gestational sac: present               Location: intrauterine   Yolk sac: present  Fetal pole: present               CRL: 2.41 cm = 9w1d  Cardiac activity: present                Rate: 173 bpm     Ovaries: normal appearing bilaterally  Cul de sac: absence of free fluid  Uterus: normal in appearance           Ultrasound Probe Disinfection    A transvaginal ultrasound was performed.   Prior to use, disinfection was performed with High Level Disinfection Process (Trophon)  Probe serial number RVRSDE: [id number] was used    Kathy Conklin PA-C  03/11/25  10:21 AM

## 2025-03-18 NOTE — PATIENT INSTRUCTIONS
Congratulations!! Please review our Pregnancy Essential Guide and [facility] L&D Virtual tour from our networks website.     [facility] Pregnancy Essentials Guide  [facility] ([email or web address])     [facility] ([employer])

## 2025-03-21 ENCOUNTER — INITIAL PRENATAL (OUTPATIENT)
Dept: OBGYN CLINIC | Facility: CLINIC | Age: 30
End: 2025-03-21

## 2025-03-21 VITALS
HEIGHT: 66 IN | WEIGHT: 192 LBS | DIASTOLIC BLOOD PRESSURE: 74 MMHG | SYSTOLIC BLOOD PRESSURE: 110 MMHG | BODY MASS INDEX: 30.86 KG/M2

## 2025-03-21 DIAGNOSIS — Z31.430 ENCOUNTER OF FEMALE FOR TESTING FOR GENETIC DISEASE CARRIER STATUS FOR PROCREATIVE MANAGEMENT: ICD-10-CM

## 2025-03-21 DIAGNOSIS — Z36.9 ENCOUNTER FOR ANTENATAL SCREENING: ICD-10-CM

## 2025-03-21 DIAGNOSIS — Z34.81 PRENATAL CARE, SUBSEQUENT PREGNANCY, FIRST TRIMESTER: Primary | ICD-10-CM

## 2025-03-21 PROCEDURE — OBC

## 2025-03-21 NOTE — PROGRESS NOTES
OB INTAKE INTERVIEW  Patient is 29 y.o. who presents for OB intake at 10 2/7 wks  She is accompanied by herself during this encounter  The father of her baby Jones Chambers is involved in the pregnancy and is 34 years old.      Last Menstrual Period: 25  Ultrasound: Measured 9 weeks 1 days on 3/11/25  Estimated Date of Delivery: 10/15/25 confirmed by 9 week US    Signs/Symptoms of Pregnancy  Current pregnancy symptoms: Nausea  Constipation YES-increasing fiber and water intake  Headaches YES- Magnesium helps  Cramping/spotting no  PICA cravings no    Diabetes-  There is no height or weight on file to calculate BMI.  If patient has 1 or more, please order early 1 hour GTT  History of GDM no  BMI >35 no  History of PCOS or current metformin use (should stop for 7 days prior to 1hr GTT unless pre-existing diabetes)  no  History of LGA/macrosomic infant (4000g/9lbs) YES-  baby 9lb 5.7 oz    If patient has 2 or more, please order early 1 hour GTT  BMI>30 YES  AMA no  First degree relative with type 2 diabetes no  History of chronic HTN, hyperlipidemia, elevated A1C no  High risk race (, , ,  or ) no    Hypertension- if you answer yes to any of the following, please order baseline preeclampsia labs (cbc, comprehensive metabolic panel, urine protein creatinine ratio, uric acid)  History of of chronic HTN no  History of gestational HTN no  History of preeclampsia, eclampsia, or HELLP syndrome no  History of diabetes no  History of lupus,sjogrens syndrome, kidney disease no    Thyroid- if yes order TSH with reflex T4  History of thyroid disease no    Bleeding Disorder or Hx of DVT-patient or first degree relative with history of. Order the following if not done previously.   (Factor V, antithrombin III, prothrombin gene mutation, protein C and S Ag, lupus anticoagulant, anticardiolipin, beta-2 glycoprotein)   no    OB/GYN-  History of abnormal pap smear  no       Date of last pap smear 23  History of HPV no  History of Herpes/HSV no  History of other STI (gonorrhea, chlamydia, trich) no  History of prior  YESX2  History of prior  no  History of  delivery prior to 36 weeks 6 days no  History of Varicella or Vaccination Had varicella  History of blood transfusion no  Ok for blood transfusion YES    Substance screening-   History of tobacco use no  Currently using tobacco no  Substance Use Screen Level (N/A, LOW, HIGH) N/A    MRSA Screening-   Does the pt have a hx of MRSA? no    Immunizations:  Influenza vaccine given this season YES  Discussed Tdap vaccine YES  Discussed COVID Vaccine YES    Genetic/MFM-  Do you or your partner have a history of any of the following in yourselves or first degree relatives?  Cystic fibrosis no  Spinal muscular atrophy no  Hemoglobinopathy/Sickle Cell/Thalassemia no  Fragile X Intellectual Disability no    If yes, discuss Carrier Screening and recommend consultation with MFM/Genetic Counseling and place specific [facility] Referral for.    If no, discuss Carrier Screening being completed once in a lifetime as a standard of care lab test. Place orders for Cystic Fibrosis Gene Test (RRT343) and Spinal Muscular Atrophy DNA (MSD5003)      Appointment for Nuchal Translucency Ultrasound at [facility] scheduled for 4/3/25       Interview education  [facility] Pregnancy Essentials Book reviewed, discussed and attached to their AVS YES    Nurse/Family Partnership- patient may qualify NO; referral placed NO    Prenatal lab work scripts YES  Extra labs ordered:  CF and SMA screening  1 hr GTT    Aspirin/Preeclampsia Screen    Risk Level Risk Factor Recommendation   LOW Prior Uncomplicated full-term delivery YES No Aspirin recommendation        MODERATE Nulliparity no Recommend low-dose aspirin if     BMI>30 YES 2 or more moderate risk factors    Family History Preeclampsia (mother/sister) no     35yr old or greater no     Black Race,  Concern for SDOH/Low Socioeconomic no     IVF Pregnancy  no     Personal History Risks (low birth weight, prior adverse preg outcome, >10yr preg interval) no         HIGH History of Preeclampsia no Recommend low-dose aspirin if     Multifetal gestation no 1 or more high risk factors    Chronic HTN no     Type 1 or 2 Diabetes no     Renal Disease no     Autoimmune Disease  no      Contraindications to ASA therapy:  NSAID/ ASA allergy: no  Nasal polyps: no  Asthma with history of ASA induced bronchospasm: no  Relative contraindications:  History of GI bleed: no  Active peptic ulcer disease: no  Severe hepatic dysfunction: no    Patient should be recommended to take ASA 162mg during this pregnancy from 12-36wks to lower her risk of preeclampsia: N/A          The patient has a history now or in prior pregnancy notable for:  X2 SAB, X2 Vaginals, Anxiety, Depression- EPDS-6, Is on Zoloft and sees her therapist twice a month.      Details that I feel the provider should be aware of: This is an unplanned however welcomed pregnancy for parents.  Patient is experiencing some nausea. OTC medications and diet were discussed. 1 hr GTT ordered per screen. Patient knows to call OB for symptoms and how to contact her nurse navigator. Patient was made aware to have lab orders completed before next appointment. Patient denies any questions at this point and verbalizes understanding.         PN1 visit scheduled. The patient was oriented to our practice, the navigator role, reviewed delivering physicians and [facility] for Delivery. All questions were answered.    Interviewed by: Page Oliver RN

## 2025-03-25 ENCOUNTER — APPOINTMENT (OUTPATIENT)
Dept: LAB | Facility: CLINIC | Age: 30
End: 2025-03-25
Payer: COMMERCIAL

## 2025-03-25 DIAGNOSIS — Z34.81 PRENATAL CARE, SUBSEQUENT PREGNANCY, FIRST TRIMESTER: ICD-10-CM

## 2025-03-25 LAB
ABO GROUP BLD: NORMAL
BACTERIA UR QL AUTO: NORMAL /HPF
BASOPHILS # BLD AUTO: 0.05 THOUSANDS/ÂΜL (ref 0–0.1)
BASOPHILS NFR BLD AUTO: 1 % (ref 0–1)
BILIRUB UR QL STRIP: NEGATIVE
BLD GP AB SCN SERPL QL: NEGATIVE
CLARITY UR: CLEAR
COLOR UR: NORMAL
EOSINOPHIL # BLD AUTO: 0.09 THOUSAND/ÂΜL (ref 0–0.61)
EOSINOPHIL NFR BLD AUTO: 1 % (ref 0–6)
ERYTHROCYTE [DISTWIDTH] IN BLOOD BY AUTOMATED COUNT: 13.2 % (ref 11.6–15.1)
GLUCOSE 1H P 50 G GLC PO SERPL-MCNC: 90 MG/DL (ref 70–134)
GLUCOSE UR STRIP-MCNC: NEGATIVE MG/DL
HCT VFR BLD AUTO: 43.3 % (ref 34.8–46.1)
HGB BLD-MCNC: 14.3 G/DL (ref 11.5–15.4)
HGB UR QL STRIP.AUTO: NEGATIVE
IMM GRANULOCYTES # BLD AUTO: 0.04 THOUSAND/UL (ref 0–0.2)
IMM GRANULOCYTES NFR BLD AUTO: 0 % (ref 0–2)
KETONES UR STRIP-MCNC: NEGATIVE MG/DL
LEUKOCYTE ESTERASE UR QL STRIP: NEGATIVE
LYMPHOCYTES # BLD AUTO: 1.89 THOUSANDS/ÂΜL (ref 0.6–4.47)
LYMPHOCYTES NFR BLD AUTO: 21 % (ref 14–44)
MCH RBC QN AUTO: 28.7 PG (ref 26.8–34.3)
MCHC RBC AUTO-ENTMCNC: 33 G/DL (ref 31.4–37.4)
MCV RBC AUTO: 87 FL (ref 82–98)
MONOCYTES # BLD AUTO: 0.43 THOUSAND/ÂΜL (ref 0.17–1.22)
MONOCYTES NFR BLD AUTO: 5 % (ref 4–12)
NEUTROPHILS # BLD AUTO: 6.47 THOUSANDS/ÂΜL (ref 1.85–7.62)
NEUTS SEG NFR BLD AUTO: 72 % (ref 43–75)
NITRITE UR QL STRIP: NEGATIVE
NON-SQ EPI CELLS URNS QL MICRO: NORMAL /HPF
NRBC BLD AUTO-RTO: 0 /100 WBCS
PH UR STRIP.AUTO: 6.5 [PH]
PLATELET # BLD AUTO: 262 THOUSANDS/UL (ref 149–390)
PMV BLD AUTO: 11.3 FL (ref 8.9–12.7)
PROT UR STRIP-MCNC: NEGATIVE MG/DL
RBC # BLD AUTO: 4.99 MILLION/UL (ref 3.81–5.12)
RBC #/AREA URNS AUTO: NORMAL /HPF
RH BLD: POSITIVE
RUBV IGG SERPL IA-ACNC: 46.4 IU/ML
SP GR UR STRIP.AUTO: 1.01 (ref 1–1.03)
UROBILINOGEN UR STRIP-ACNC: <2 MG/DL
WBC # BLD AUTO: 8.97 THOUSAND/UL (ref 4.31–10.16)
WBC #/AREA URNS AUTO: NORMAL /HPF

## 2025-03-25 PROCEDURE — 87389 HIV-1 AG W/HIV-1&-2 AB AG IA: CPT

## 2025-03-25 PROCEDURE — 86803 HEPATITIS C AB TEST: CPT

## 2025-03-25 PROCEDURE — 86900 BLOOD TYPING SEROLOGIC ABO: CPT

## 2025-03-25 PROCEDURE — 85025 COMPLETE CBC W/AUTO DIFF WBC: CPT

## 2025-03-25 PROCEDURE — 86901 BLOOD TYPING SEROLOGIC RH(D): CPT

## 2025-03-25 PROCEDURE — 87340 HEPATITIS B SURFACE AG IA: CPT

## 2025-03-25 PROCEDURE — 86780 TREPONEMA PALLIDUM: CPT

## 2025-03-25 PROCEDURE — 81001 URINALYSIS AUTO W/SCOPE: CPT

## 2025-03-25 PROCEDURE — 86850 RBC ANTIBODY SCREEN: CPT

## 2025-03-25 PROCEDURE — 36415 COLL VENOUS BLD VENIPUNCTURE: CPT

## 2025-03-25 PROCEDURE — 86762 RUBELLA ANTIBODY: CPT

## 2025-03-25 PROCEDURE — 82950 GLUCOSE TEST: CPT

## 2025-03-25 PROCEDURE — 87086 URINE CULTURE/COLONY COUNT: CPT

## 2025-03-26 LAB
HBV SURFACE AG SER QL: NORMAL
HCV AB SER QL: NORMAL
HIV 1+2 AB+HIV1 P24 AG SERPL QL IA: NORMAL
TREPONEMA PALLIDUM IGG+IGM AB [PRESENCE] IN SERUM OR PLASMA BY IMMUNOASSAY: NORMAL

## 2025-03-27 ENCOUNTER — RESULTS FOLLOW-UP (OUTPATIENT)
Dept: OBGYN CLINIC | Facility: CLINIC | Age: 30
End: 2025-03-27

## 2025-03-27 LAB — BACTERIA UR CULT: NORMAL

## 2025-04-01 ENCOUNTER — APPOINTMENT (OUTPATIENT)
Dept: LAB | Facility: CLINIC | Age: 30
End: 2025-04-01
Payer: COMMERCIAL

## 2025-04-01 DIAGNOSIS — Z36.9 ENCOUNTER FOR ANTENATAL SCREENING: ICD-10-CM

## 2025-04-01 DIAGNOSIS — Z31.430 ENCOUNTER OF FEMALE FOR TESTING FOR GENETIC DISEASE CARRIER STATUS FOR PROCREATIVE MANAGEMENT: ICD-10-CM

## 2025-04-01 PROBLEM — O99.211 OBESITY AFFECTING PREGNANCY IN FIRST TRIMESTER: Status: ACTIVE | Noted: 2025-04-01

## 2025-04-01 PROBLEM — O09.299 HISTORY OF MACROSOMIA IN INFANT IN PRIOR PREGNANCY, CURRENTLY PREGNANT: Status: ACTIVE | Noted: 2025-04-01

## 2025-04-01 PROCEDURE — 36415 COLL VENOUS BLD VENIPUNCTURE: CPT

## 2025-04-03 ENCOUNTER — ROUTINE PRENATAL (OUTPATIENT)
Dept: PERINATAL CARE | Facility: OTHER | Age: 30
End: 2025-04-03
Payer: COMMERCIAL

## 2025-04-03 VITALS
SYSTOLIC BLOOD PRESSURE: 100 MMHG | BODY MASS INDEX: 31.63 KG/M2 | HEART RATE: 94 BPM | HEIGHT: 66 IN | DIASTOLIC BLOOD PRESSURE: 68 MMHG | WEIGHT: 196.8 LBS

## 2025-04-03 DIAGNOSIS — Z36.0 ENCOUNTER FOR ANTENATAL SCREENING FOR CHROMOSOMAL ANOMALIES: Primary | ICD-10-CM

## 2025-04-03 DIAGNOSIS — O09.299 HISTORY OF MACROSOMIA IN INFANT IN PRIOR PREGNANCY, CURRENTLY PREGNANT: ICD-10-CM

## 2025-04-03 DIAGNOSIS — O99.211 OBESITY AFFECTING PREGNANCY IN FIRST TRIMESTER, UNSPECIFIED OBESITY TYPE: ICD-10-CM

## 2025-04-03 DIAGNOSIS — Z36.82 ENCOUNTER FOR (NT) NUCHAL TRANSLUCENCY SCAN: ICD-10-CM

## 2025-04-03 DIAGNOSIS — Z32.01 PREGNANCY, CONFIRMED, NOT FIRST: ICD-10-CM

## 2025-04-03 DIAGNOSIS — Z3A.12 12 WEEKS GESTATION OF PREGNANCY: ICD-10-CM

## 2025-04-03 PROCEDURE — 76801 OB US < 14 WKS SINGLE FETUS: CPT | Performed by: OBSTETRICS & GYNECOLOGY

## 2025-04-03 PROCEDURE — 76813 OB US NUCHAL MEAS 1 GEST: CPT | Performed by: OBSTETRICS & GYNECOLOGY

## 2025-04-03 PROCEDURE — 99244 OFF/OP CNSLTJ NEW/EST MOD 40: CPT | Performed by: PHYSICIAN ASSISTANT

## 2025-04-03 PROCEDURE — 36415 COLL VENOUS BLD VENIPUNCTURE: CPT | Performed by: OBSTETRICS & GYNECOLOGY

## 2025-04-03 NOTE — PROGRESS NOTES
"[facility]: Niki Chambers was seen today for nuchal translucency ultrasound.  See ultrasound report under \"OB Procedures\" tab.   Please don't hesitate to contact our office with any concerns or questions.  -Effie Abdullahi MD      "

## 2025-04-03 NOTE — LETTER
"April 3, 2025    Kathy Conklin PA-C  [address]   [address]  [place] [address]    Patient: Niki Chambers   YOB: 1995   Date of Visit: 4/3/2025   Gestational age 12w1d   Nature of this communication: Routine       Dear Kathy Conklin,    This patient was seen recently in our  office.  The content of our evaluation today is in the ultrasound report under \"OB Procedures\" tab.     Please don't hesitate to contact our office with any concerns or questions.     Sincerely,      Effie Abdullahi MD  Attending Physician, Maternal-Fetal Medicine  [facility]      "

## 2025-04-03 NOTE — PROGRESS NOTES
Patient chose to have [facility] [id number] Non-Invasive Prenatal Screen [id number] MT21 PLUS Core + SCA, NO fetal sex.  Patient choose to be billed through insurance.     Patient given brochure and is aware [facility] will contact patient's insurance and coordinate coverage.  Provided [facility] contact information. General inquiries [phone], Cost estimates [phone] and [facility] Billing [phone]. Website [email or web address]     Blood collection tubes labeled with patient identifiers (name, medical record number, and date of birth).     Filled out [facility] order form. Patient chose to have blood drawn in our office at time of visit. NIPS was drawn from left arm with a butterfly needle by Ifeoma GARCIA MA. .      Maternal Fetal Medicine will have results in approximately 5-7 business days and will call patient or notify via [employer].  Patient aware viewing lab result online will reveal fetal sex if ordered.    Patient verbalized understanding of all instructions and no questions at this time.

## 2025-04-04 LAB
CFDNA.FET/CFDNA.TOTAL SFR FETUS: NORMAL %
CFDNA.FET/CFDNA.TOTAL SFR FETUS: NORMAL %
CITATION REF LAB TEST: NORMAL
FET 13+18+21+X+Y ANEUP PLAS.CFDNA: NORMAL
FET CHR 21 TS PLAS.CFDNA QL: NORMAL
FET CHR 21 TS PLAS.CFDNA QL: NORMAL
FET MS X RISK WBC.DNA+CFDNA QL: NORMAL
FET SEX PLAS.CFDNA DOSAGE CFDNA: NORMAL
FET TS 13 RISK PLAS.CFDNA QL: NORMAL
FET X + Y ANEUP RISK PLAS.CFDNA SEQ-IMP: NORMAL
GA EST FROM CONCEPTION DATE: NORMAL D
GESTATIONAL AGE > 9:: NORMAL
LAB DIRECTOR NAME PROVIDER: NORMAL
LABORATORY COMMENT REPORT: NORMAL
LIMITATIONS OF THE TEST: NORMAL
NEGATIVE PREDICTIVE VALUE: NORMAL
PERFORMANCE CHARACTERISTICS: NORMAL
POSITIVE PREDICTIVE VALUE: NORMAL
REF LAB TEST METHOD: NORMAL
SL AMB NOTE:: NORMAL
TEST PERFORMANCE INFO SPEC: NORMAL

## 2025-04-07 LAB
CITATION REF LAB TEST: NORMAL
CLINICAL INFO: NORMAL
ETHNIC BACKGROUND STATED: NORMAL
GENE DIS ANL CARRIER INTERP-IMP: NORMAL
GENE MUT TESTED BLD/T: NORMAL
LAB DIRECTOR NAME PROVIDER: NORMAL
REASON FOR REFERRAL (NARRATIVE): NORMAL
RECOMMENDATION PATIENT DOC-IMP: NORMAL
REF LAB TEST METHOD: NORMAL
SERVICE CMNT-IMP: NORMAL
SMN1 GENE MUT ANL BLD/T: NORMAL
SPECIMEN SOURCE: NORMAL

## 2025-04-08 ENCOUNTER — RESULTS FOLLOW-UP (OUTPATIENT)
Facility: HOSPITAL | Age: 30
End: 2025-04-08

## 2025-04-08 LAB
CFDNA.FET/CFDNA.TOTAL SFR FETUS: NORMAL %
CFDNA.FET/CFDNA.TOTAL SFR FETUS: NORMAL %
CITATION REF LAB TEST: NORMAL
FET 13+18+21+X+Y ANEUP PLAS.CFDNA: NEGATIVE
FET CHR 21 TS PLAS.CFDNA QL: NEGATIVE
FET CHR 21 TS PLAS.CFDNA QL: NEGATIVE
FET MS X RISK WBC.DNA+CFDNA QL: NOT DETECTED
FET SEX PLAS.CFDNA DOSAGE CFDNA: NORMAL
FET TS 13 RISK PLAS.CFDNA QL: NEGATIVE
FET X + Y ANEUP RISK PLAS.CFDNA SEQ-IMP: NOT DETECTED
GA EST FROM CONCEPTION DATE: NORMAL D
GESTATIONAL AGE > 9:: YES
LAB DIRECTOR NAME PROVIDER: NORMAL
LABORATORY COMMENT REPORT: NORMAL
LIMITATIONS OF THE TEST: NORMAL
NEGATIVE PREDICTIVE VALUE: NORMAL
PERFORMANCE CHARACTERISTICS: NORMAL
POSITIVE PREDICTIVE VALUE: NORMAL
REF LAB TEST METHOD: NORMAL
SL AMB NOTE:: NORMAL
TEST PERFORMANCE INFO SPEC: NORMAL

## 2025-04-11 ENCOUNTER — INITIAL PRENATAL (OUTPATIENT)
Dept: OBGYN CLINIC | Facility: CLINIC | Age: 30
End: 2025-04-11

## 2025-04-11 VITALS — BODY MASS INDEX: 31.73 KG/M2 | DIASTOLIC BLOOD PRESSURE: 60 MMHG | SYSTOLIC BLOOD PRESSURE: 114 MMHG | WEIGHT: 196.6 LBS

## 2025-04-11 DIAGNOSIS — Z11.3 SCREEN FOR STD (SEXUALLY TRANSMITTED DISEASE): ICD-10-CM

## 2025-04-11 DIAGNOSIS — Z34.81 ENCOUNTER FOR SUPERVISION OF OTHER NORMAL PREGNANCY IN FIRST TRIMESTER: Primary | ICD-10-CM

## 2025-04-11 DIAGNOSIS — Z3A.13 13 WEEKS GESTATION OF PREGNANCY: ICD-10-CM

## 2025-04-11 PROCEDURE — PNV

## 2025-04-11 PROCEDURE — 87491 CHLMYD TRACH DNA AMP PROBE: CPT

## 2025-04-11 PROCEDURE — 87591 N.GONORRHOEAE DNA AMP PROB: CPT

## 2025-04-11 NOTE — PROGRESS NOTES
Patient is a 30 YO  female presenting to the office at 13w2d for routine OB care.   Patient is feeling well today. Increased HA frequency, was improving with Mg QD. Discussed adding riboflavin, staying well hydrated, Tylenol PRN.  Fetal heart rate: 155  BP: 114/60  TWlb 9.6oz  Fetal Movement: not feeling yet     Cramping: no  Bleeding: no  LOF: no  NT/13 week scan scheduled: yes  Anatomy scan scheduled: yes, 2025  AFP ordered if indicated: yes  Prenatal labs complete (including Heb B, HIV): yes; date completed 3/25/2025  Pap collected: no  GC collected:yes  OK to transfuse and code  Oriented to practice/delivery location.   RTO 4 weeks

## 2025-04-11 NOTE — PROGRESS NOTES
Pt c/o headaches, occasional nausea, denies any vaginal bleeding or spotting  Urine dip negative for Glucose or Protein

## 2025-04-14 LAB
C TRACH DNA SPEC QL NAA+PROBE: NEGATIVE
CFTR FULL MUT ANL BLD/T SEQ: NORMAL
CITATION REF LAB TEST: NORMAL
CLINICAL INFO: NORMAL
ETHNIC BACKGROUND STATED: NORMAL
GENE DIS ANL CARRIER INTERP-IMP: NORMAL
INDICATION: NORMAL
LAB DIRECTOR NAME PROVIDER: NORMAL
N GONORRHOEA DNA SPEC QL NAA+PROBE: NEGATIVE
RECOMMENDATION PATIENT DOC-IMP: NORMAL
REF LAB TEST METHOD: NORMAL
SERVICE CMNT-IMP: NORMAL
SPECIMEN SOURCE: NORMAL

## 2025-05-01 ENCOUNTER — APPOINTMENT (OUTPATIENT)
Dept: LAB | Facility: CLINIC | Age: 30
End: 2025-05-01
Payer: COMMERCIAL

## 2025-05-01 DIAGNOSIS — Z3A.13 13 WEEKS GESTATION OF PREGNANCY: ICD-10-CM

## 2025-05-01 PROCEDURE — 36415 COLL VENOUS BLD VENIPUNCTURE: CPT

## 2025-05-01 PROCEDURE — 82105 ALPHA-FETOPROTEIN SERUM: CPT

## 2025-05-04 LAB
2ND TRIMESTER 4 SCREEN SERPL-IMP: NORMAL
AFP ADJ MOM SERPL: 1.02
AFP INTERP AMN-IMP: NORMAL
AFP INTERP SERPL-IMP: NORMAL
AFP INTERP SERPL-IMP: NORMAL
AFP SERPL-MCNC: 28.9 NG/ML
AGE AT DELIVERY: 28.1 YR
GA METHOD: NORMAL
GA: 16.1 WEEKS
IDDM PATIENT QL: NO
MULTIPLE PREGNANCY: NO
NEURAL TUBE DEFECT RISK FETUS: NORMAL %

## 2025-05-05 ENCOUNTER — RESULTS FOLLOW-UP (OUTPATIENT)
Dept: OBGYN CLINIC | Facility: CLINIC | Age: 30
End: 2025-05-05

## 2025-05-12 ENCOUNTER — ROUTINE PRENATAL (OUTPATIENT)
Dept: OBGYN CLINIC | Facility: CLINIC | Age: 30
End: 2025-05-12

## 2025-05-12 VITALS — BODY MASS INDEX: 32.09 KG/M2 | SYSTOLIC BLOOD PRESSURE: 98 MMHG | WEIGHT: 198.8 LBS | DIASTOLIC BLOOD PRESSURE: 64 MMHG

## 2025-05-12 DIAGNOSIS — Z3A.17 17 WEEKS GESTATION OF PREGNANCY: ICD-10-CM

## 2025-05-12 DIAGNOSIS — O09.299 HISTORY OF MACROSOMIA IN INFANT IN PRIOR PREGNANCY, CURRENTLY PREGNANT: Primary | ICD-10-CM

## 2025-05-12 PROCEDURE — PNV: Performed by: PHYSICIAN ASSISTANT

## 2025-05-12 NOTE — PROGRESS NOTES
Urine neg/neg  Pt feeling good, tired  No vag bleeding/lof  No cramping/ctx  No questions at this time

## 2025-05-12 NOTE — PROGRESS NOTES
29 y.o.  female at 17w5d (Estimated Date of Delivery: 10/15/25) for PNV.    Pre- Vitals      Flowsheet Row Most Recent Value   Prenatal Assessment    Prenatal Vitals    Blood Pressure 98/64   Weight - Scale 90.2 kg (198 lb 12.8 oz)   Urine Albumin/Glucose    Dilation/Effacement/Station    Vaginal Drainage    Edema           TW.177 kg (4 lb 12.8 oz)    Leakage of fluid: no  Vaginal bleeding: no  Contractions/Cramping: no  Fetal movement: no  Pt is feeling well  BW all normal  Has level 2/20 week US scheduled    RTO in 4 weeks.

## 2025-05-29 ENCOUNTER — ROUTINE PRENATAL (OUTPATIENT)
Dept: PERINATAL CARE | Facility: CLINIC | Age: 30
End: 2025-05-29
Payer: COMMERCIAL

## 2025-05-29 ENCOUNTER — ANCILLARY PROCEDURE (OUTPATIENT)
Dept: PERINATAL CARE | Facility: CLINIC | Age: 30
End: 2025-05-29
Attending: OBSTETRICS & GYNECOLOGY
Payer: COMMERCIAL

## 2025-05-29 VITALS
SYSTOLIC BLOOD PRESSURE: 110 MMHG | HEIGHT: 66 IN | HEART RATE: 78 BPM | DIASTOLIC BLOOD PRESSURE: 70 MMHG | WEIGHT: 201.8 LBS | OXYGEN SATURATION: 99 % | BODY MASS INDEX: 32.43 KG/M2

## 2025-05-29 DIAGNOSIS — Z36.86 ENCOUNTER FOR ANTENATAL SCREENING FOR CERVICAL LENGTH: ICD-10-CM

## 2025-05-29 DIAGNOSIS — Z3A.20 20 WEEKS GESTATION OF PREGNANCY: ICD-10-CM

## 2025-05-29 DIAGNOSIS — Z36.3 ENCOUNTER FOR ANTENATAL SCREENING FOR MALFORMATION USING ULTRASOUND: ICD-10-CM

## 2025-05-29 DIAGNOSIS — O43.199 MARGINAL INSERTION OF UMBILICAL CORD AFFECTING MANAGEMENT OF MOTHER: ICD-10-CM

## 2025-05-29 DIAGNOSIS — O09.299 HISTORY OF MACROSOMIA IN INFANT IN PRIOR PREGNANCY, CURRENTLY PREGNANT: ICD-10-CM

## 2025-05-29 DIAGNOSIS — Z3A.20 20 WEEKS GESTATION OF PREGNANCY: Primary | ICD-10-CM

## 2025-05-29 DIAGNOSIS — O99.211 OBESITY AFFECTING PREGNANCY IN FIRST TRIMESTER, UNSPECIFIED OBESITY TYPE: ICD-10-CM

## 2025-05-29 PROCEDURE — 76817 TRANSVAGINAL US OBSTETRIC: CPT | Performed by: STUDENT IN AN ORGANIZED HEALTH CARE EDUCATION/TRAINING PROGRAM

## 2025-05-29 PROCEDURE — NC001 PR NO CHARGE: Performed by: STUDENT IN AN ORGANIZED HEALTH CARE EDUCATION/TRAINING PROGRAM

## 2025-05-29 PROCEDURE — 76811 OB US DETAILED SNGL FETUS: CPT | Performed by: STUDENT IN AN ORGANIZED HEALTH CARE EDUCATION/TRAINING PROGRAM

## 2025-05-29 NOTE — PROGRESS NOTES
"[facility]: Niki Chambers was seen today for anatomic survey and cervical length screening ultrasound. See ultrasound report under \"Imaging\" tab.     She has a history of macrosomia.  She had a low risk NIPT.  MSAFP screening was normal.  She had a normal early Glucola.    The anatomic survey is within normal limits though incomplete.  There is a marginal cord insertion.    Vitals:    25 0758   BP: 110/70   Pulse: 78   SpO2: 99%     Problem List Items Addressed This Visit       History of macrosomia in infant in prior pregnancy, currently pregnant    Obesity affecting pregnancy in first trimester    Marginal insertion of umbilical cord affecting management of mother    Marginal cord is defined as a placental cord insertion less than 2 cm from the edge of the placental disc. This is largely considered an anatomic variant. It is relatively common with an incidence of approximately 6%. We discussed that this has a weak association with fetal growth restriction. A third-trimester growth ultrasound is recommended            Other Visit Diagnoses         20 weeks gestation of pregnancy    -  Primary      Encounter for  screening for cervical length          Encounter for  screening for malformation using ultrasound              Return for growth at 32 weeks    Evaluation/Management:  MDM:   I. Diagnoses/Problems addressed:  Low  II.  Data: Moderate  III.  Risk of morbidity: Low      Please don't hesitate to contact our office with any concerns or questions.    -Vickie Zepeda MD  "

## 2025-05-29 NOTE — ASSESSMENT & PLAN NOTE
Marginal cord is defined as a placental cord insertion less than 2 cm from the edge of the placental disc. This is largely considered an anatomic variant. It is relatively common with an incidence of approximately 6%. We discussed that this has a weak association with fetal growth restriction. A third-trimester growth ultrasound is recommended

## 2025-06-09 ENCOUNTER — TELEPHONE (OUTPATIENT)
Age: 30
End: 2025-06-09

## 2025-06-09 NOTE — TELEPHONE ENCOUNTER
Patient called stating that she had missed her 21 week ob appt today 6/9/25. I looked to see if there was anything for this week and did not find anything. Patient will be out of town the week of 6/16/25. Can we fit her in sometime this week?

## 2025-07-07 ENCOUNTER — ROUTINE PRENATAL (OUTPATIENT)
Dept: OBGYN CLINIC | Facility: CLINIC | Age: 30
End: 2025-07-07

## 2025-07-07 VITALS — SYSTOLIC BLOOD PRESSURE: 104 MMHG | WEIGHT: 209.4 LBS | DIASTOLIC BLOOD PRESSURE: 72 MMHG | BODY MASS INDEX: 33.8 KG/M2

## 2025-07-07 DIAGNOSIS — Z3A.25 25 WEEKS GESTATION OF PREGNANCY: ICD-10-CM

## 2025-07-07 DIAGNOSIS — O43.199 MARGINAL INSERTION OF UMBILICAL CORD AFFECTING MANAGEMENT OF MOTHER: ICD-10-CM

## 2025-07-07 DIAGNOSIS — O09.299 HISTORY OF MACROSOMIA IN INFANT IN PRIOR PREGNANCY, CURRENTLY PREGNANT: Primary | ICD-10-CM

## 2025-07-07 PROCEDURE — PNV: Performed by: OBSTETRICS & GYNECOLOGY

## 2025-07-07 NOTE — PATIENT INSTRUCTIONS
You are due for your next set of labs. We request that you go to the lab to have your blood work done between 26-28 weeks.     Labs ordered include a blood count to check for anemia, a syphilis test which is state mandated and the 1 hour glucose test to screen for diabetes in pregnancy.     If you are on medication for hypothyroidism, we will also check a thyroid level (TSH).     If your blood type is RH negative, we will also check a type and screen and will recommend a Rhogam injection at your 28 week visit.       Glucose Testing Instructions  One-hour glucose tolerance test:    The test should be done at 26-28 weeks pregnant, unless otherwise directed by your physician.   Normally you should be avoiding sugars during pregnancy. Eat normally prior to the test.  You do not need fast. Once you have finished the glucola do NOT EAT or DRINK anything until your blood is drawn.   The glucola will be given to you by the .   You will be required to wait at the lab for one hour after drinking the glucola. After one hour, your blood will be drawn.   If the test is normal, it will be reported via [employer]. If it is abnormal, we will notify you and a 3 hour glucose test will be recommended.

## 2025-07-07 NOTE — ASSESSMENT & PLAN NOTE
Orders:    Glucose, 1H PG; Future    RPR-Syphilis Screening (Total Syphilis IGG/IGM); Future    CBC; Future

## 2025-07-07 NOTE — PROGRESS NOTES
2nd Trimester Visit  Name: Niki Chambers      : 1995      MRN: [id number]  Encounter Provider: Laura Zuñiga DO  Encounter Date: 2025   Encounter department: [facility]    29 y.o.  at 25w5d presenting for routine OB visit at 25w5d.:  Assessment & Plan  History of macrosomia in infant in prior pregnancy, currently pregnant    Orders:    Glucose, 1H PG; Future    RPR-Syphilis Screening (Total Syphilis IGG/IGM); Future    CBC; Future    Marginal insertion of umbilical cord affecting management of mother    Orders:    Glucose, 1H PG; Future    RPR-Syphilis Screening (Total Syphilis IGG/IGM); Future    CBC; Future    25 weeks gestation of pregnancy    Orders:    Glucose, 1H PG; Future    RPR-Syphilis Screening (Total Syphilis IGG/IGM); Future    CBC; Future        Scheduled for ultrasound   - growth .  Reviewed common discomforts of pregnancy in second trimester and warning signs.  Advised to continue medications and return in 3-4 weeks.    History of Present Illness     She reports feeling well and no concerns.  Denies uterine contractions or persistent cramping.  Denies vaginal bleeding or leaking of fluid.  Reports fetal movement.    Medical History Reviewed by provider this encounter:  Tobacco  Allergies  Meds  Problems  Med Hx  Surg Hx  Fam Hx     .     Current Outpatient Medications   Medication Instructions    loratadine (CLARITIN) 10 mg, Daily    Prenatal Multivit-Min-Fe-FA (PRENATAL VITAMINS PO) 1 tablet, Daily    sertraline (ZOLOFT) 50 mg tablet TAKE 1 AND 1/2 TABLETS(75 MG) BY MOUTH DAILY     Objective   /72   Wt 95 kg (209 lb 6.4 oz)   LMP 2025 (Exact Date)   BMI 33.80 kg/m²      BP: Blood Pressure: 104/72  Wt: 95 kg (209 lb 6.4 oz); Body mass index is 33.8 kg/m².; TWG=6.985 kg (15 lb 6.4 oz)  Fetal Heart Rate: 155;      Abdomen: gravid, non tender         pregnancy Problems (from 25 to present)       No problems  associated with this episode.

## 2025-08-01 ENCOUNTER — ROUTINE PRENATAL (OUTPATIENT)
Dept: OBGYN CLINIC | Facility: CLINIC | Age: 30
End: 2025-08-01
Payer: COMMERCIAL

## 2025-08-01 ENCOUNTER — APPOINTMENT (OUTPATIENT)
Dept: LAB | Facility: CLINIC | Age: 30
End: 2025-08-01
Payer: COMMERCIAL

## 2025-08-01 VITALS — SYSTOLIC BLOOD PRESSURE: 120 MMHG | DIASTOLIC BLOOD PRESSURE: 70 MMHG | WEIGHT: 213 LBS | BODY MASS INDEX: 34.38 KG/M2

## 2025-08-01 DIAGNOSIS — O43.199 MARGINAL INSERTION OF UMBILICAL CORD AFFECTING MANAGEMENT OF MOTHER: ICD-10-CM

## 2025-08-01 DIAGNOSIS — Z3A.29 29 WEEKS GESTATION OF PREGNANCY: ICD-10-CM

## 2025-08-01 DIAGNOSIS — Z23 NEED FOR DIPHTHERIA-TETANUS-PERTUSSIS (TDAP) VACCINE: ICD-10-CM

## 2025-08-01 DIAGNOSIS — Z3A.25 25 WEEKS GESTATION OF PREGNANCY: ICD-10-CM

## 2025-08-01 DIAGNOSIS — O09.299 HISTORY OF MACROSOMIA IN INFANT IN PRIOR PREGNANCY, CURRENTLY PREGNANT: Primary | ICD-10-CM

## 2025-08-01 DIAGNOSIS — O09.299 HISTORY OF MACROSOMIA IN INFANT IN PRIOR PREGNANCY, CURRENTLY PREGNANT: ICD-10-CM

## 2025-08-01 DIAGNOSIS — N81.89 OTHER FEMALE GENITAL PROLAPSE: ICD-10-CM

## 2025-08-01 LAB — GLUCOSE 1H P 50 G GLC PO SERPL-MCNC: 100 MG/DL (ref 70–134)

## 2025-08-01 PROCEDURE — 90715 TDAP VACCINE 7 YRS/> IM: CPT | Performed by: OBSTETRICS & GYNECOLOGY

## 2025-08-01 PROCEDURE — 90471 IMMUNIZATION ADMIN: CPT | Performed by: OBSTETRICS & GYNECOLOGY

## 2025-08-01 PROCEDURE — PNV: Performed by: OBSTETRICS & GYNECOLOGY

## 2025-08-01 PROCEDURE — 82950 GLUCOSE TEST: CPT

## 2025-08-07 ENCOUNTER — TELEPHONE (OUTPATIENT)
Dept: OBGYN CLINIC | Facility: CLINIC | Age: 30
End: 2025-08-07

## 2025-08-14 ENCOUNTER — ROUTINE PRENATAL (OUTPATIENT)
Dept: OBGYN CLINIC | Facility: CLINIC | Age: 30
End: 2025-08-14

## 2025-08-14 PROBLEM — Z3A.31 31 WEEKS GESTATION OF PREGNANCY: Status: ACTIVE | Noted: 2025-07-07

## 2025-08-14 PROBLEM — Z3A.32 32 WEEKS GESTATION OF PREGNANCY: Status: ACTIVE | Noted: 2025-07-07

## 2025-08-20 ENCOUNTER — TELEPHONE (OUTPATIENT)
Dept: OBGYN CLINIC | Facility: CLINIC | Age: 30
End: 2025-08-20

## 2025-08-21 ENCOUNTER — ULTRASOUND (OUTPATIENT)
Dept: PERINATAL CARE | Facility: CLINIC | Age: 30
End: 2025-08-21
Payer: COMMERCIAL

## 2025-08-21 VITALS
BODY MASS INDEX: 34.13 KG/M2 | SYSTOLIC BLOOD PRESSURE: 110 MMHG | WEIGHT: 212.4 LBS | OXYGEN SATURATION: 98 % | HEART RATE: 84 BPM | HEIGHT: 66 IN | DIASTOLIC BLOOD PRESSURE: 62 MMHG

## 2025-08-21 DIAGNOSIS — O43.199 MARGINAL INSERTION OF UMBILICAL CORD AFFECTING MANAGEMENT OF MOTHER: Primary | ICD-10-CM

## 2025-08-21 DIAGNOSIS — O09.299 HISTORY OF MACROSOMIA IN INFANT IN PRIOR PREGNANCY, CURRENTLY PREGNANT: ICD-10-CM

## 2025-08-21 DIAGNOSIS — Z3A.32 32 WEEKS GESTATION OF PREGNANCY: ICD-10-CM

## 2025-08-21 DIAGNOSIS — O99.211 OBESITY AFFECTING PREGNANCY IN FIRST TRIMESTER, UNSPECIFIED OBESITY TYPE: ICD-10-CM
